# Patient Record
Sex: FEMALE | Race: WHITE | NOT HISPANIC OR LATINO | Employment: FULL TIME | URBAN - METROPOLITAN AREA
[De-identification: names, ages, dates, MRNs, and addresses within clinical notes are randomized per-mention and may not be internally consistent; named-entity substitution may affect disease eponyms.]

---

## 2017-02-21 ENCOUNTER — OFFICE VISIT (OUTPATIENT)
Dept: OBSTETRICS AND GYNECOLOGY | Facility: CLINIC | Age: 37
End: 2017-02-21
Attending: OBSTETRICS & GYNECOLOGY
Payer: COMMERCIAL

## 2017-02-21 VITALS
BODY MASS INDEX: 23.55 KG/M2 | HEIGHT: 58 IN | SYSTOLIC BLOOD PRESSURE: 110 MMHG | WEIGHT: 112.19 LBS | DIASTOLIC BLOOD PRESSURE: 72 MMHG

## 2017-02-21 DIAGNOSIS — N94.10 DYSPAREUNIA IN FEMALE: ICD-10-CM

## 2017-02-21 DIAGNOSIS — N94.810 VULVAR VESTIBULITIS: ICD-10-CM

## 2017-02-21 DIAGNOSIS — B37.31 CANDIDIASIS OF VULVA AND VAGINA: ICD-10-CM

## 2017-02-21 DIAGNOSIS — R10.2 VULVAR PAIN: Primary | ICD-10-CM

## 2017-02-21 PROCEDURE — 1160F RVW MEDS BY RX/DR IN RCRD: CPT | Mod: S$GLB,,, | Performed by: OBSTETRICS & GYNECOLOGY

## 2017-02-21 PROCEDURE — 99204 OFFICE O/P NEW MOD 45 MIN: CPT | Mod: S$GLB,,, | Performed by: OBSTETRICS & GYNECOLOGY

## 2017-02-21 PROCEDURE — 87102 FUNGUS ISOLATION CULTURE: CPT

## 2017-02-21 PROCEDURE — 99999 PR PBB SHADOW E&M-EST. PATIENT-LVL III: CPT | Mod: PBBFAC,,, | Performed by: OBSTETRICS & GYNECOLOGY

## 2017-02-21 PROCEDURE — 87210 SMEAR WET MOUNT SALINE/INK: CPT | Mod: QW,S$GLB,, | Performed by: OBSTETRICS & GYNECOLOGY

## 2017-02-21 RX ORDER — LIDOCAINE 50 MG/G
OINTMENT TOPICAL
Qty: 50 G | Refills: 2 | Status: SHIPPED | OUTPATIENT
Start: 2017-02-21 | End: 2018-10-05

## 2017-02-21 RX ORDER — MELOXICAM 7.5 MG/1
TABLET ORAL
Refills: 1 | COMMUNITY
Start: 2016-12-27 | End: 2017-11-15 | Stop reason: SDUPTHER

## 2017-02-21 NOTE — PROGRESS NOTES
Subjective:     Patient ID: Tiffanie Wharton is a 36 y.o. female.     Chief Complaint: Dyspareunia     History of Present Illness: This patient is a 36 y.o.female, who presents to the G[facility] Vul[facility] clinic for evaluation of vulvar pain and dyspareunia (discomfort with attempts to penetrate the hymenal area). the patient is able to use tampons patient is able to have a speculum and fingers inserted into the vagina with minimal discomfort.  Penetration with larger sex toys causes discomfort.     Patient's last menstrual period was 02/19/2017.    Review of Systems    GENERAL: No fever, chills, fatigability or weightchange  SKIN: No rashes, itching or changes in color or texture of skin.  HEAD: No headaches or recent head trauma.  EYES: Visual acuity fine. No photophobia,r diplopia.  EARS: Denies earache or vertigo  NOSE: No loss of smell, no epistaxis or postnasal drip.  MOUTH & THROAT: No hoarseness or change in voice.   NODES: Denies swollen glands.  CHEST: Denies THOMAS, cyanosis, wheezing, cough and sputum production.  CARDIOVASCULAR: Denies chest pain, PND, orthopnea or reduced exercise tolerance.  ABDOMEN: Appetite fine. No weight loss. bloating, Denies diarrhea, abdominal pain, hematemesis or blood in stool.  URINARY: No flank pain, dysuria or hematuria.  PERIPHERAL VASCULAR: No claudication or cyanosis.Varicosities  MUSCULOSKELETAL: No joint stiffness or swelling. Denies back pain.muscle aches  NEUROLOGIC: No history of seizures, paralysis, alteration of gait or coordination.       Objective:       Physical Exam     APPEARANCE: Well nourished, well developed, in no acute distress.    GENITOURINARY:  Vulva: No lesions. Normal female genital architecture. Q-Tip test indicates discomfort in the vestibule 1/5, around the hymenal ring from 11-6:00 ? Slightly greater on right than 1 to 6:00 Left.  Urethral Meatus: Normal size and location, no lesions, no prolapse.  Urethra: No masses, tenderness, prolapse or scarring.  Vagina:  moist with rugae, menstrual discharge noted, no significant cystocele or rectocele.  No muscle spasm noted pliable.  Cervix: No lesions, normal diameter, no stenosis, no cervical motion tenderness. .  Uterus: 6 week size, regular shape, mobile, non-tender, normal position, good support.  Adnexa: No masses, tenderness or CDS nodularity.  Anus Perineum: No lesions, no relaxation, no external hemorrhoids.  Abdomen: No masses, tenderness, hernia or ascites, no hepatasplenomegaly  Skin: No rashes, lesions, ulcers, acne, hirsutism.  Peripheral/lower extremities: No edema, erythema or tenderness.  Lymphatic: No axillary, neck or groin nodes palp.  Mental Status: Alert, oriented x 3, normal affect and mood.          @PROCEDURE:@  Wet Prep:  Wet prep omitted because of menstrual flow.       Assessment:    1. Vulvar pain    2. Dyspareunia in female    3. Vulvar vestibulitis               Plan:  1.  Candida culture taken today.  2.  Discussed vulvodynia pathophysiology and the reason for the treatment with neurotransmitter blockers and local anesthetics.  3.  Discussed increasing her Lexapro to 20 mg per day.  4.  Discussed lidocaine ointment use and pelvic floor physical therapy referral for evaluation and possible dilator therapy.  5.   Return to clinic in 9 weeks.              No orders of the defined types were placed in this encounter.

## 2017-02-21 NOTE — MR AVS SNAPSHOT
[facility] [address]  [address]  [place] [address]  Phone: [phone]  Fax: [phone]                  Tiffanie Wharton   2017 11:00 AM   Office Visit    Description:  Female : 1980   Provider:  Albino Singh III, MD   Department:  [facility] [address]           Reason for Visit     Dyspareunia                To Do List           Goals (5 Years of Data)     None      [facility]     [facility] On Call Nurse [facility] -  Assistance  Registered nurses in the [facility] On Call Center provide clinical advisement, health education, appointment booking, and other advisory services.  Call for this free service at [phone].             Medications           Message regarding Medications     Verify the changes and/or additions to your medication regime listed below are the same as discussed with your clinician today.  If any of these changes or additions are incorrect, please notify your healthcare provider.        STOP taking these medications     azithromycin (ZITHROMAX Z-BISI) 250 MG tablet As directed           Verify that the below list of medications is an accurate representation of the medications you are currently taking.  If none reported, the list may be blank. If incorrect, please contact your healthcare provider. Carry this list with you in case of emergency.           Current Medications     clobetasol (TEMOVATE) 0.05 % cream 1 application 2 (two) times daily. Apply to affected area    clonazePAM (KLONOPIN) 0.5 MG tablet Take 0.5 mg by mouth as needed.     dextroamphetamine (DEXEDRINE SPANSULE) 10 MG 24 hr capsule Take 10 mg by mouth once daily.    DOMPERIDONE, BULK, MISC by Misc.(Non-Drug; Combo Route) route.    trazodone (DESYREL) 25 MG tablet Take by mouth every evening.    EPIPEN 2-BISI 0.3 mg/0.3 mL AtIn INJECT 0.3 MLS (0.3 MG TOTAL) INTO THE MUSCLE ONCE.    EPIPEN 2-BISI 0.3 mg/0.3 mL AtIn INJECT 0.3 MLS (0.3 MG TOTAL) INTO THE MUSCLE ONCE.     "escitalopram oxalate (LEXAPRO) 10 MG tablet Take 1 tablet by mouth once daily.    hydrocodone-acetaminophen 5-325mg (NORCO) 5-325 mg per tablet Take 1 tablet by mouth every 4 to 6 hours as needed for Pain.    hydrocodone-acetaminophen 5-325mg (NORCO) 5-325 mg per tablet Take 1 tablet by mouth every 6 (six) hours as needed for Pain.    lamotrigine (LAMICTAL) 100 MG tablet Take 100 mg by mouth once daily.    meloxicam (MOBIC) 7.5 MG tablet TAKE 1 TO 2 TABLETS BY MOUTH EVERY DAY    PREVIDENT 5000 BOOSTER PLUS 1.1 % Pste AS DIRECTED    promethazine (PHENERGAN) 25 MG tablet Take 25 mg by mouth every 4 (four) hours.           Clinical Reference Information           Your Vitals Were     BP Height Weight Last Period BMI    110/72 4' 10" (1.473 m) 50.9 kg (112 lb 3.4 oz) 02/19/2017 23.45 kg/m2      Blood Pressure          Most Recent Value    BP  110/72      Allergies as of 2/21/2017     Penicillins      Immunizations Administered on Date of Encounter - 2/21/2017     None      Language Assistance Services     ATTENTION: Language assistance services are available, free of charge. Please call [phone].      ATENCIÓN: Si nolanla yuri, tiene a dorman disposición servicios gratuitos de asistencia lingüística. Llame al [phone].     [facility] Ý: N?u b?n nói Ti?ng Vi?t, có các d?ch v? h? tr? ngôn ng? mi?n phí dành cho b?n. G?i s? [phone].         [facility] [address] complies with applicable Federal civil rights laws and does not discriminate on the basis of race, color, national origin, age, disability, or sex.        "

## 2017-03-02 DIAGNOSIS — B37.31 CANDIDA VAGINITIS: Primary | ICD-10-CM

## 2017-03-02 RX ORDER — FLUCONAZOLE 200 MG/1
TABLET ORAL
Qty: 3 TABLET | Refills: 0 | Status: SHIPPED | OUTPATIENT
Start: 2017-03-02 | End: 2018-10-05

## 2017-03-02 NOTE — TELEPHONE ENCOUNTER
----- Message from Albino Singh III, MD sent at 3/2/2017  1:28 PM CST -----  Notify the patient that her Candida culture was positive.  Treated with Diflucan 200 mg, dispense 3 tablet, she is to take one tablet every 3rd day for 3 doses.

## 2017-03-16 ENCOUNTER — CLINICAL SUPPORT (OUTPATIENT)
Dept: REHABILITATION | Facility: HOSPITAL | Age: 37
End: 2017-03-16
Attending: OBSTETRICS & GYNECOLOGY
Payer: COMMERCIAL

## 2017-03-16 DIAGNOSIS — R10.2 PELVIC PAIN IN FEMALE: ICD-10-CM

## 2017-03-16 PROCEDURE — 97110 THERAPEUTIC EXERCISES: CPT | Mod: PO

## 2017-03-16 PROCEDURE — 97161 PT EVAL LOW COMPLEX 20 MIN: CPT | Mod: PO

## 2017-03-16 NOTE — PATIENT INSTRUCTIONS
Home Exercise Program: 03/16/2017      DIAPHRAGMATIC BREATHING     The diaphragm is a dome shaped muscle that forms the floor of the rib cage. It is the most efficient muscle for breathing and relaxation, although most people are not used to using the diaphragm. Diaphragmatic or belly breathing is an important technique to learn because it helps settle down or relax the autonomic nervous system. The correct use of diaphragmatic breathing can help to quiet brain activity resulting in the relaxation of all the muscles and organs of the body. This is accomplished by slow rhythmic breathing concentrated in the diaphragm muscle rather than the chest.    How to do proper relaxation breathing:     Start by lying on your back or reclining in a chair in a relaxed position. Place one hand on your chest and the other on your abdomen.   Relax your jaw by placing your tongue on the roof of your mouth and keeping your teeth slightly apart.    Take a deep breath in through your nose, letting the abdomen expand and rise while you keep your upper chest, neck and shoulders relaxed.    As you breathe out through your mouth, allow your abdomen and chest to fall. Exhale completely.   Remember to breathe slowly.  Do not force your breathing. Do not hold your breath.   Repeat for 5 minutes every day.      Home Perineal Desensitization:    5-7 days per week, spend about 5-10 minutes mobilizing the tissues at the bottom of the vaginal opening (6 o'clock position).  You should only need to insert your thumb to the first knuckle.  Wash hand prior, and be careful not to cross-contaminate yourself- avoid the perineal body and anal area.  Start with pressure only in a few directions, then progress to motion side to side and back and forth.  No more than 5/10 pain with this.  Do not do during your period or if you have an infection.      [email or web address] for dilator set

## 2017-03-16 NOTE — PROGRESS NOTES
Patient: Tiffanie Wharton   [facility] #:  [id number]    Date of treatment: 03/16/2017   Time in: 11:13  Time out: 12:05    Tiffanie is a 36 y.o. female evaluated on 3/16/2017    Physician:  Albino Singh III, *   Diagnosis:   Encounter Diagnosis   Name Primary?    Pelvic pain in female         Treatment ordered: PT    Medical History:   Past Medical History:   Diagnosis Date    ADHD (attention deficit hyperactivity disorder)     Crohn's disease     Gastroparesis         Surgical History:   Past Surgical History:   Procedure Laterality Date    ABDOMINAL SURGERY          Medications:   Current Outpatient Prescriptions   Medication Sig    clobetasol (TEMOVATE) 0.05 % cream 1 application 2 (two) times daily. Apply to affected area    clonazePAM (KLONOPIN) 0.5 MG tablet Take 0.5 mg by mouth as needed.     dextroamphetamine (DEXEDRINE SPANSULE) 10 MG 24 hr capsule Take 10 mg by mouth once daily.    DOMPERIDONE, BULK, MISC by Misc.(Non-Drug; Combo Route) route.    EPIPEN 2-BISI 0.3 mg/0.3 mL AtIn INJECT 0.3 MLS (0.3 MG TOTAL) INTO THE MUSCLE ONCE.    EPIPEN 2-BISI 0.3 mg/0.3 mL AtIn INJECT 0.3 MLS (0.3 MG TOTAL) INTO THE MUSCLE ONCE.    escitalopram oxalate (LEXAPRO) 10 MG tablet Take 1 tablet by mouth once daily.    fluconazole (DIFLUCAN) 200 MG Tab Take one tab by mouth today and repeat dose every third day until completed    hydrocodone-acetaminophen 5-325mg (NORCO) 5-325 mg per tablet Take 1 tablet by mouth every 4 to 6 hours as needed for Pain.    hydrocodone-acetaminophen 5-325mg (NORCO) 5-325 mg per tablet Take 1 tablet by mouth every 6 (six) hours as needed for Pain.    lamotrigine (LAMICTAL) 100 MG tablet Take 100 mg by mouth once daily.    lidocaine (XYLOCAINE) 5 % Oint ointment Apply a thin film (1/3 a pencil eraser), Twice a day, used a cotton ball at the hour sleep    meloxicam (MOBIC) 7.5 MG tablet TAKE 1 TO 2 TABLETS BY MOUTH EVERY DAY    PREVIDENT 5000 BOOSTER PLUS 1.1 % Pste AS DIRECTED     promethazine (PHENERGAN) 25 MG tablet Take 25 mg by mouth every 4 (four) hours.    trazodone (DESYREL) 25 MG tablet Take by mouth every evening.     Current Facility-Administered Medications   Medication    0.9 % NaCl with KCl 20 mEq infusion    dexamethasone injection 8 mg    ondansetron HCl (PF) 4 mg/2 mL injection 8 mg    ondansetron HCl (PF) 4 mg/2 mL injection 8 mg    ondansetron HCl (PF) 4 mg/2 mL injection 8 mg    ondansetron HCl (PF) 4 mg/2 mL injection 8 mg    ondansetron HCl (PF) 4 mg/2 mL injection 8 mg    ondansetron HCl (PF) 4 mg/2 mL injection 8 mg    ondansetron HCl (PF) 4 mg/2 mL injection 8 mg    promethazine (PHENERGAN) 12.5 mg in dextrose 5 % 50 mL IVPB    promethazine (PHENERGAN) 12.5 mg in dextrose 5 % 50 mL IVPB    promethazine (PHENERGAN) 12.5 mg in dextrose 5 % 50 mL IVPB    promethazine (PHENERGAN) 12.5 mg in dextrose 5 % 50 mL IVPB    promethazine (PHENERGAN) 12.5 mg in dextrose 5 % 50 mL IVPB    promethazine (PHENERGAN) 12.5 mg in dextrose 5 % 50 mL IVPB    promethazine (PHENERGAN) 12.5 mg in dextrose 5 % 50 mL IVPB    promethazine (PHENERGAN) 12.5 mg in dextrose 5 % 50 mL IVPB    promethazine (PHENERGAN) 12.5 mg in dextrose 5 % 50 mL IVPB    promethazine (PHENERGAN) 12.5 mg in dextrose 5 % 50 mL IVPB    promethazine (PHENERGAN) 12.5 mg in dextrose 5 % 50 mL IVPB    promethazine (PHENERGAN) 12.5 mg in dextrose 5 % 50 mL IVPB    promethazine (PHENERGAN) 12.5 mg in dextrose 5 % 50 mL IVPB    promethazine (PHENERGAN) 12.5 mg in dextrose 5 % 50 mL IVPB    promethazine (PHENERGAN) 12.5 mg in dextrose 5 % 50 mL IVPB    promethazine (PHENERGAN) 12.5 mg in sodium chloride 0.9% IVPB    promethazine (PHENERGAN) 12.5 mg in sodium chloride 0.9% IVPB    promethazine injection 12.5 mg    sodium chloride 0.9% bolus 1,000 mL    sodium chloride 0.9% bolus 1,500 mL       Allergies:   Review of patient's allergies indicates:   Allergen Reactions    Penicillins Rash       Precautions: universal  OB:GYN History:painful vaginal penetration; regular periods      Bladder/Bowel History: gastroparesis; flares produce nausea, and constipation- she cannot tolerate fiber.  Takes Mag Citrate every day. Pt has an implantable gastric stimulator.       SUBJECTIVE:   History of current complaint: pt reports that she is sexually active (with women) and has had some penetration in the past that has been comfortable, but about 5 years ago started having discomfort with insertion of sex toys.  No problems with tampon use.  She describes an uncomfortable stretching pain with penetration, at the introitus.  She denies bleeding with penetration attempts.      Date of Onset: about 5 years ago  Symptoms are: unchanged    Frequency of Urination: Daytime: less than 5 times per day (unless dehydrated due to gastroparesis)           Nighttime: 0-1    Urine Stream: strong    Frequency of Bowel Movements: 3 times per week with Mag Citrate    Types of Fluid Intake: gatorade (tolerates better than water) no caffeine or EtOH      Bladder Leakage: No    Colon Leakage: No    Activities that cause symptoms:sexual activity    Current Exercise: none; has had a recent foot fracture and has been out of a cast boot for a week     Pain:  Patient reports 7/10 with 0 being the lowest and 10 being the highest (reached her tolerance limit at this point)      OBJECTIVE:  Patient received 50 minutes of treatment which consisted of evaluation and 10 minutes of ther ex.    ORTHO SCREEN  Posture: WNL      Pelvic Alignment: deviations: L ASIS slightly higher in supine       ABDOMINALS  Scarring:  Several abdominal scars noted- no pain, good mobility      Diastasis: none   Abdominal strength: rectus 4/5     Transverse : able to isolate and contract TA with verbal cues.         VAGINAL PELVIC FLOOR EXAM  Introitus:  WNL  Skin Condition: WNL  Contraction Response:   visible lift  Valsalva Response bulge    External Clock  Scarring:  none  Sensation:WNL  Pain:Q-tip testing as follows: pain noted both superficial to and at the hymenal ring.  Pain noted 3-9 o'clock  Prolapse Check: none       Internal Clock  Pain: tender areas noted as follows: thick band of tissue at the 6 0'clock position painful with pressure and mobilization; muscles not painful  Sensation:WNL    Vaginal Vault : stenotic  Muscle Bulk:WFL  Muscle Power: 3        Quality of Contraction:  WNL  Specificity:WNL   Coordination: tends to hold breath during PFM contraction    SEMG Evaluation: deferred for now      Treatment Given: instructed on general anatomy/physiology of urinary/bowel system; discussed plan of care with patient; instructed in benefits/risks of treatment; instructed in alternative methods of treatment; instructed in risks of refusing treatment; patient agreed to treatment plan; instructed in diaphragmatic breathing per handout issued and in perineal stretching/desensitization; she was also instructed to purchase a [email or web address] dilator set.  Pt verbalized understanding of all instruction.      ASSESSMENT:   History  Co-morbidities and personal factors that may impact the plan of care Examination  Body Structures and Functions, activity limitations and participation restrictions that may impact the plan of care Clinical Presentation   Decision Making/ Complexity Score   Co-morbidities:     Gastroparesis with implantable gastric stimulator             Personal Factors:    Body Regions: painful vulvar connective tissue; tight pelvic floor muscles    Body Systems:           Activity limitations:   Pt cannot achieve full vaginal penetration during sexual activity    Participation Restrictions:          Stable and uncomplicated 8% impairment per PFDI pain survey via FOTO         Problem List:    poor knowledge of body mechanics and posture, increased tension of the pelvic muscles and poor quality of pelvic muscle contraction    Barriers to Learning:  none    Educational/Spiritual/Cultural needs:  None  Environmental Barriers: none noted  Abuse/Neglect: no signs  Nutritional Status: WDWN WF  Fall Risk: none    FUNCTIONAL GOALS: 3 months  1. Able to maintain normal breathing pattern during pelvic floor muscle contraction.,   2. [place] with less pain on initial penetration or deeply.,   3. Pt to be I with self mgmt. of symptoms.    4. Pt to be I with HEP.    PATIENTS GOALS: to be able to have vaginal penetration without pain during sexual activity    PLAN:  Myofascial release, soft tissue mobilization, stretching, theraputic exercises and neuromuscular re-ed    Physical Therapy Education: anatomy/physiology of pelvic floor, posture/body mechanices, vulvar irritants, dilator use and diaphragmatic breathing    Frequency/Duration: once per 2 weeks for 3 months     I agree with the Therapist's evaluation and therapeutic plan

## 2017-03-21 LAB — FUNGUS SPEC CULT: NORMAL

## 2017-03-28 ENCOUNTER — INFUSION (OUTPATIENT)
Dept: INFECTIOUS DISEASES | Facility: HOSPITAL | Age: 37
End: 2017-03-28
Attending: INTERNAL MEDICINE
Payer: COMMERCIAL

## 2017-03-28 VITALS
RESPIRATION RATE: 15 BRPM | BODY MASS INDEX: 23.41 KG/M2 | SYSTOLIC BLOOD PRESSURE: 105 MMHG | WEIGHT: 112 LBS | OXYGEN SATURATION: 96 % | TEMPERATURE: 98 F | DIASTOLIC BLOOD PRESSURE: 75 MMHG

## 2017-03-28 DIAGNOSIS — R11.0 NAUSEA: ICD-10-CM

## 2017-03-28 DIAGNOSIS — R52 PAIN: Primary | ICD-10-CM

## 2017-03-28 PROCEDURE — 25000003 PHARM REV CODE 250: Performed by: INTERNAL MEDICINE

## 2017-03-28 PROCEDURE — 96361 HYDRATE IV INFUSION ADD-ON: CPT

## 2017-03-28 PROCEDURE — 96365 THER/PROPH/DIAG IV INF INIT: CPT

## 2017-03-28 PROCEDURE — 63600175 PHARM REV CODE 636 W HCPCS: Performed by: INTERNAL MEDICINE

## 2017-03-28 PROCEDURE — 96374 THER/PROPH/DIAG INJ IV PUSH: CPT

## 2017-03-28 PROCEDURE — 96360 HYDRATION IV INFUSION INIT: CPT

## 2017-03-28 RX ORDER — PROMETHAZINE HYDROCHLORIDE 25 MG/ML
12.5 INJECTION, SOLUTION INTRAMUSCULAR; INTRAVENOUS ONCE
Status: CANCELLED
Start: 2017-03-28 | End: 2017-03-28

## 2017-03-28 RX ORDER — ONDANSETRON 2 MG/ML
8 INJECTION INTRAMUSCULAR; INTRAVENOUS EVERY 4 HOURS PRN
Status: CANCELLED
Start: 2017-03-28

## 2017-03-28 RX ADMIN — PROMETHAZINE HYDROCHLORIDE: 25 INJECTION INTRAMUSCULAR; INTRAVENOUS at 09:03

## 2017-03-28 RX ADMIN — SODIUM CHLORIDE 1500 ML: 0.9 INJECTION, SOLUTION INTRAVENOUS at 08:03

## 2017-03-28 NOTE — PROGRESS NOTES
Pt tolerated Normal Saline infusion@350 ml/hr and Phenergan 25 mg IVPB without difficulty, no C/O pain/discomfort, pt instructed to call with any questions/concerns related to infusion therapy, pt verbalized understanding

## 2017-04-18 ENCOUNTER — INFUSION (OUTPATIENT)
Dept: INFECTIOUS DISEASES | Facility: HOSPITAL | Age: 37
End: 2017-04-18
Attending: INTERNAL MEDICINE
Payer: COMMERCIAL

## 2017-04-18 VITALS
SYSTOLIC BLOOD PRESSURE: 98 MMHG | BODY MASS INDEX: 23.41 KG/M2 | WEIGHT: 112 LBS | DIASTOLIC BLOOD PRESSURE: 64 MMHG | TEMPERATURE: 98 F | OXYGEN SATURATION: 97 % | RESPIRATION RATE: 14 BRPM

## 2017-04-18 DIAGNOSIS — R11.0 NAUSEA: Primary | ICD-10-CM

## 2017-04-18 PROCEDURE — 96360 HYDRATION IV INFUSION INIT: CPT

## 2017-04-18 PROCEDURE — 96375 TX/PRO/DX INJ NEW DRUG ADDON: CPT

## 2017-04-18 PROCEDURE — 96374 THER/PROPH/DIAG INJ IV PUSH: CPT

## 2017-04-18 PROCEDURE — 63600175 PHARM REV CODE 636 W HCPCS: Performed by: INTERNAL MEDICINE

## 2017-04-18 PROCEDURE — 25000003 PHARM REV CODE 250: Performed by: INTERNAL MEDICINE

## 2017-04-18 PROCEDURE — 96361 HYDRATE IV INFUSION ADD-ON: CPT

## 2017-04-18 RX ORDER — ONDANSETRON 2 MG/ML
8 INJECTION INTRAMUSCULAR; INTRAVENOUS EVERY 4 HOURS PRN
Status: DISCONTINUED | OUTPATIENT
Start: 2017-04-18 | End: 2017-04-18 | Stop reason: HOSPADM

## 2017-04-18 RX ORDER — PROMETHAZINE HYDROCHLORIDE 25 MG/ML
12.5 INJECTION, SOLUTION INTRAMUSCULAR; INTRAVENOUS ONCE
Status: CANCELLED
Start: 2017-04-18 | End: 2017-04-18

## 2017-04-18 RX ORDER — ONDANSETRON 2 MG/ML
8 INJECTION INTRAMUSCULAR; INTRAVENOUS EVERY 4 HOURS PRN
Status: CANCELLED
Start: 2017-04-18

## 2017-04-18 RX ADMIN — ONDANSETRON 8 MG: 2 INJECTION INTRAMUSCULAR; INTRAVENOUS at 08:04

## 2017-04-18 RX ADMIN — SODIUM CHLORIDE 1500 ML: 0.9 INJECTION, SOLUTION INTRAVENOUS at 08:04

## 2017-04-18 NOTE — PROGRESS NOTES
Pt tolerated Normal Saline infusion@350 ml/hr, Zofran 8mg IVP without difficulty, no C/O pain/discomfort, pt instructed to call with any questions/concerns related to infusion therapy, pt verbalized understanding

## 2017-04-20 ENCOUNTER — CLINICAL SUPPORT (OUTPATIENT)
Dept: REHABILITATION | Facility: HOSPITAL | Age: 37
End: 2017-04-20
Attending: OBSTETRICS & GYNECOLOGY
Payer: COMMERCIAL

## 2017-04-20 DIAGNOSIS — R10.2 PELVIC PAIN IN FEMALE: ICD-10-CM

## 2017-04-20 PROCEDURE — 97140 MANUAL THERAPY 1/> REGIONS: CPT | Mod: PO

## 2017-04-20 NOTE — PROGRESS NOTES
Patient: Tiffanie Wharton   Clinic #: [id number]   Diagnosis:   Encounter Diagnosis   Name Primary?    Pelvic pain in female       Date of Start of care: 3/16/2017  Date of treatment: 2017   Time in: 2:00  Time out: 2:40  Total Treatment time: 40 minutes    Subjective: pt reports that she has not had a good month- had to let her cat go ( from cancer) and she forgot the dilators at home.  Has been working on the perineal massage a little.    Tiffanie reported 0/10 pain today.    Objective:      Treatment:    Tiffanie Wharton  received the following manual therapy techniques: trigger point/myofascial release and soft tissue Mobilization applied to the: levator ani B'ly and OI B'ly  x 40 minutes.   She was given instructions on how to start using her dilator set (see pt instructions).  She tolerated contract/relax techniques well, and was able to actively drop the pelvic floor with DB.      Assessment: Pt should be able to start using her dilator set now- she knows to contact me if she has any problems.  Will reassess in 2 weeks.       Will the patient continue to benefit from skilled PT intervention? Yes  Medical Necessity is demonstrated by:  Pain limits function of effected part for all activities  Progress towards goals: fair    New/Revised Goals:  none    Plan:  Continue with established Plan of Care toward PT goals.

## 2017-04-20 NOTE — PATIENT INSTRUCTIONS
"Home Program: 4/20/2017    Continue to use Diaphragmatic Breathing whenever you need to drop the pelvic floor- peeing, pooping, using dilator    Dilator stretching: 3-5 days per week for no more than 15 minutes.    1. Be sure to position yourself either reclining or seated on toilet with abs turned off.  Make sure that dilator has been cleaned and rinsed well.  2. Water based lubricant or coconut oil.  3. Use a dilator slightly larger than your finger to do muscle work.  Insert like a tampon, toward the tailbone.    4. Apply pressure to the tissues at 6 o'clock for a general stretch.    5. The levator ani muscles will be located at 4 and 8 o'clock.  Apply gentle pressure for a minute, then do a pelvic floor contraction; hold 3-5 sec, then drop.  When you drop, let the dilator "go along for the ride." Keep gentle pressure until you feel like you've dropped completely.    6. Repeat the contract/relax technique 3-5 times on each side.  You can also practice moving the skin tissues by twisting the dilator, or moving it in and out.  This may be best done with a larger size.    7. No dilator work on your period or during an active infection (yeast, UTI).  "

## 2017-04-26 ENCOUNTER — INFUSION (OUTPATIENT)
Dept: INFECTIOUS DISEASES | Facility: HOSPITAL | Age: 37
End: 2017-04-26
Attending: INTERNAL MEDICINE
Payer: COMMERCIAL

## 2017-04-26 VITALS — WEIGHT: 112 LBS | BODY MASS INDEX: 23.41 KG/M2

## 2017-04-26 DIAGNOSIS — R11.0 NAUSEA: Primary | ICD-10-CM

## 2017-04-26 PROCEDURE — 96361 HYDRATE IV INFUSION ADD-ON: CPT

## 2017-04-26 PROCEDURE — 96360 HYDRATION IV INFUSION INIT: CPT

## 2017-04-26 PROCEDURE — 96374 THER/PROPH/DIAG INJ IV PUSH: CPT

## 2017-04-26 PROCEDURE — 63600175 PHARM REV CODE 636 W HCPCS: Performed by: INTERNAL MEDICINE

## 2017-04-26 PROCEDURE — 25000003 PHARM REV CODE 250: Performed by: INTERNAL MEDICINE

## 2017-04-26 PROCEDURE — 96375 TX/PRO/DX INJ NEW DRUG ADDON: CPT

## 2017-04-26 RX ORDER — ONDANSETRON 2 MG/ML
8 INJECTION INTRAMUSCULAR; INTRAVENOUS EVERY 4 HOURS PRN
Status: DISCONTINUED | OUTPATIENT
Start: 2017-04-26 | End: 2017-04-26 | Stop reason: HOSPADM

## 2017-04-26 RX ORDER — ONDANSETRON 2 MG/ML
8 INJECTION INTRAMUSCULAR; INTRAVENOUS EVERY 4 HOURS PRN
Status: CANCELLED
Start: 2017-04-26

## 2017-04-26 RX ORDER — PROMETHAZINE HYDROCHLORIDE 25 MG/ML
12.5 INJECTION, SOLUTION INTRAMUSCULAR; INTRAVENOUS ONCE
Status: CANCELLED
Start: 2017-04-26 | End: 2017-04-26

## 2017-04-26 RX ADMIN — SODIUM CHLORIDE 1500 ML: 0.9 INJECTION, SOLUTION INTRAVENOUS at 08:04

## 2017-04-26 RX ADMIN — ONDANSETRON 8 MG: 2 INJECTION INTRAMUSCULAR; INTRAVENOUS at 08:04

## 2017-05-02 ENCOUNTER — PATIENT MESSAGE (OUTPATIENT)
Dept: REHABILITATION | Facility: HOSPITAL | Age: 37
End: 2017-05-02

## 2017-05-02 ENCOUNTER — INFUSION (OUTPATIENT)
Dept: INFECTIOUS DISEASES | Facility: HOSPITAL | Age: 37
End: 2017-05-02
Attending: INTERNAL MEDICINE
Payer: COMMERCIAL

## 2017-05-02 VITALS
TEMPERATURE: 98 F | DIASTOLIC BLOOD PRESSURE: 68 MMHG | BODY MASS INDEX: 23.41 KG/M2 | HEART RATE: 91 BPM | RESPIRATION RATE: 16 BRPM | SYSTOLIC BLOOD PRESSURE: 100 MMHG | WEIGHT: 112 LBS

## 2017-05-02 DIAGNOSIS — R11.0 NAUSEA: Primary | ICD-10-CM

## 2017-05-02 PROCEDURE — 96375 TX/PRO/DX INJ NEW DRUG ADDON: CPT

## 2017-05-02 PROCEDURE — 63600175 PHARM REV CODE 636 W HCPCS: Performed by: INTERNAL MEDICINE

## 2017-05-02 PROCEDURE — 96361 HYDRATE IV INFUSION ADD-ON: CPT

## 2017-05-02 PROCEDURE — 96365 THER/PROPH/DIAG IV INF INIT: CPT

## 2017-05-02 PROCEDURE — 25000003 PHARM REV CODE 250: Performed by: INTERNAL MEDICINE

## 2017-05-02 RX ORDER — PROMETHAZINE HYDROCHLORIDE 25 MG/ML
12.5 INJECTION, SOLUTION INTRAMUSCULAR; INTRAVENOUS ONCE
Status: CANCELLED
Start: 2017-05-02 | End: 2017-05-02

## 2017-05-02 RX ORDER — ONDANSETRON 2 MG/ML
8 INJECTION INTRAMUSCULAR; INTRAVENOUS EVERY 4 HOURS PRN
Status: CANCELLED
Start: 2017-05-02

## 2017-05-02 RX ORDER — ONDANSETRON 2 MG/ML
8 INJECTION INTRAMUSCULAR; INTRAVENOUS EVERY 4 HOURS PRN
Status: DISCONTINUED | OUTPATIENT
Start: 2017-05-02 | End: 2017-05-02 | Stop reason: HOSPADM

## 2017-05-02 RX ADMIN — ONDANSETRON 8 MG: 2 INJECTION INTRAMUSCULAR; INTRAVENOUS at 08:05

## 2017-05-02 RX ADMIN — SODIUM CHLORIDE 1500 ML: 0.9 INJECTION, SOLUTION INTRAVENOUS at 08:05

## 2017-05-10 ENCOUNTER — INFUSION (OUTPATIENT)
Dept: INFECTIOUS DISEASES | Facility: HOSPITAL | Age: 37
End: 2017-05-10
Attending: INTERNAL MEDICINE
Payer: COMMERCIAL

## 2017-05-10 VITALS
BODY MASS INDEX: 23.51 KG/M2 | RESPIRATION RATE: 13 BRPM | TEMPERATURE: 97 F | OXYGEN SATURATION: 95 % | HEIGHT: 58 IN | DIASTOLIC BLOOD PRESSURE: 68 MMHG | WEIGHT: 112 LBS | SYSTOLIC BLOOD PRESSURE: 91 MMHG | HEART RATE: 86 BPM

## 2017-05-10 DIAGNOSIS — R11.0 NAUSEA: Primary | ICD-10-CM

## 2017-05-10 PROCEDURE — 96374 THER/PROPH/DIAG INJ IV PUSH: CPT

## 2017-05-10 PROCEDURE — 96360 HYDRATION IV INFUSION INIT: CPT

## 2017-05-10 PROCEDURE — 63600175 PHARM REV CODE 636 W HCPCS: Performed by: INTERNAL MEDICINE

## 2017-05-10 PROCEDURE — 25000003 PHARM REV CODE 250: Performed by: INTERNAL MEDICINE

## 2017-05-10 PROCEDURE — 96361 HYDRATE IV INFUSION ADD-ON: CPT

## 2017-05-10 PROCEDURE — 96375 TX/PRO/DX INJ NEW DRUG ADDON: CPT

## 2017-05-10 RX ORDER — ONDANSETRON 2 MG/ML
8 INJECTION INTRAMUSCULAR; INTRAVENOUS EVERY 4 HOURS PRN
Status: DISCONTINUED | OUTPATIENT
Start: 2017-05-10 | End: 2017-05-10 | Stop reason: HOSPADM

## 2017-05-10 RX ORDER — PROMETHAZINE HYDROCHLORIDE 25 MG/ML
12.5 INJECTION, SOLUTION INTRAMUSCULAR; INTRAVENOUS ONCE
Status: CANCELLED
Start: 2017-05-10 | End: 2017-05-10

## 2017-05-10 RX ORDER — ONDANSETRON 2 MG/ML
8 INJECTION INTRAMUSCULAR; INTRAVENOUS EVERY 4 HOURS PRN
Status: CANCELLED
Start: 2017-05-10

## 2017-05-10 RX ADMIN — ONDANSETRON 8 MG: 2 INJECTION INTRAMUSCULAR; INTRAVENOUS at 09:05

## 2017-05-10 RX ADMIN — SODIUM CHLORIDE 1500 ML: 0.9 INJECTION, SOLUTION INTRAVENOUS at 09:05

## 2017-05-25 ENCOUNTER — LAB VISIT (OUTPATIENT)
Dept: LAB | Facility: OTHER | Age: 37
End: 2017-05-25
Attending: INTERNAL MEDICINE
Payer: COMMERCIAL

## 2017-05-25 DIAGNOSIS — R10.2 ADNEXAL TENDERNESS: ICD-10-CM

## 2017-05-25 DIAGNOSIS — F32.89 DEPRESSIVE DISORDER, NOT ELSEWHERE CLASSIFIED: ICD-10-CM

## 2017-05-25 DIAGNOSIS — R52 PAIN: ICD-10-CM

## 2017-05-25 DIAGNOSIS — K31.84 GASTROPARESIS: ICD-10-CM

## 2017-05-25 DIAGNOSIS — R11.0 NAUSEA: ICD-10-CM

## 2017-05-25 LAB
ALBUMIN SERPL BCP-MCNC: 4.4 G/DL
ALP SERPL-CCNC: 64 U/L
ALT SERPL W/O P-5'-P-CCNC: 11 U/L
ANION GAP SERPL CALC-SCNC: 14 MMOL/L
AST SERPL-CCNC: 18 U/L
BASOPHILS # BLD AUTO: 0.02 K/UL
BASOPHILS NFR BLD: 0.3 %
BILIRUB SERPL-MCNC: 0.3 MG/DL
BUN SERPL-MCNC: 10 MG/DL
CALCIUM SERPL-MCNC: 9.7 MG/DL
CHLORIDE SERPL-SCNC: 102 MMOL/L
CO2 SERPL-SCNC: 23 MMOL/L
CREAT SERPL-MCNC: 0.8 MG/DL
DIFFERENTIAL METHOD: NORMAL
EOSINOPHIL # BLD AUTO: 0.2 K/UL
EOSINOPHIL NFR BLD: 3.5 %
ERYTHROCYTE [DISTWIDTH] IN BLOOD BY AUTOMATED COUNT: 13 %
EST. GFR  (AFRICAN AMERICAN): >60 ML/MIN/1.73 M^2
EST. GFR  (NON AFRICAN AMERICAN): >60 ML/MIN/1.73 M^2
GLUCOSE SERPL-MCNC: 88 MG/DL
HCT VFR BLD AUTO: 40 %
HGB BLD-MCNC: 13.3 G/DL
LYMPHOCYTES # BLD AUTO: 1.9 K/UL
LYMPHOCYTES NFR BLD: 31.4 %
MAGNESIUM SERPL-MCNC: 2.2 MG/DL
MCH RBC QN AUTO: 28.9 PG
MCHC RBC AUTO-ENTMCNC: 33.3 %
MCV RBC AUTO: 87 FL
MONOCYTES # BLD AUTO: 0.6 K/UL
MONOCYTES NFR BLD: 10 %
NEUTROPHILS # BLD AUTO: 3.3 K/UL
NEUTROPHILS NFR BLD: 54.5 %
PLATELET # BLD AUTO: 261 K/UL
PMV BLD AUTO: 12.5 FL
POTASSIUM SERPL-SCNC: 3.8 MMOL/L
PROT SERPL-MCNC: 7.8 G/DL
RBC # BLD AUTO: 4.61 M/UL
SODIUM SERPL-SCNC: 139 MMOL/L
T4 FREE SERPL-MCNC: 0.84 NG/DL
TSH SERPL DL<=0.005 MIU/L-ACNC: 1.37 UIU/ML
VIT B12 SERPL-MCNC: 969 PG/ML
WBC # BLD AUTO: 5.98 K/UL

## 2017-05-25 PROCEDURE — 85025 COMPLETE CBC W/AUTO DIFF WBC: CPT

## 2017-05-25 PROCEDURE — 80053 COMPREHEN METABOLIC PANEL: CPT

## 2017-05-25 PROCEDURE — 36415 COLL VENOUS BLD VENIPUNCTURE: CPT

## 2017-05-25 PROCEDURE — 82607 VITAMIN B-12: CPT

## 2017-05-25 PROCEDURE — 81241 F5 GENE: CPT

## 2017-05-25 PROCEDURE — 84439 ASSAY OF FREE THYROXINE: CPT

## 2017-05-25 PROCEDURE — 83735 ASSAY OF MAGNESIUM: CPT

## 2017-05-25 PROCEDURE — 84443 ASSAY THYROID STIM HORMONE: CPT

## 2017-05-31 ENCOUNTER — PATIENT MESSAGE (OUTPATIENT)
Dept: REHABILITATION | Facility: HOSPITAL | Age: 37
End: 2017-05-31

## 2017-06-28 ENCOUNTER — PATIENT MESSAGE (OUTPATIENT)
Dept: REHABILITATION | Facility: HOSPITAL | Age: 37
End: 2017-06-28

## 2017-07-06 ENCOUNTER — CLINICAL SUPPORT (OUTPATIENT)
Dept: REHABILITATION | Facility: HOSPITAL | Age: 37
End: 2017-07-06
Attending: OBSTETRICS & GYNECOLOGY
Payer: COMMERCIAL

## 2017-07-06 DIAGNOSIS — R10.2 PELVIC PAIN IN FEMALE: ICD-10-CM

## 2017-07-06 PROCEDURE — 97110 THERAPEUTIC EXERCISES: CPT | Mod: PO

## 2017-07-06 NOTE — PROGRESS NOTES
Patient: Tiffanie Wharton   Clinic #: [id number]   Diagnosis:   Encounter Diagnosis   Name Primary?    Pelvic pain in female       Date of Start of care: 3/16/2017  Date of treatment: 07/06/2017   Time in: 9:30  Time out: 9:55  Total Treatment time: 25 minutes    Subjective: pt reports that she has left her dilators in [place].  Has not been able to use them for about a week and a half.  She has only used the smallest size because she didn't know how to progress herself.  She notes that she holds her PF muscles tight a lot, and that she uses DB to help to drop and release the pelvic floor muscles when she notices this.       Tiffanie reported 0/10 pain today.    Objective:      Treatment: pt performed the following therapeutic exercise x 25 minutes for increased coordination: Kegels with manual cueing; DB for pelvic floor drop.  She was noted to be able to perform both appropriately with verbal cues to hold pelvic floor lift without breath holding (was able to correct).  No pain was noted in either LA or OI with palpation.  We reviewed her dilator plan- she was instructed to progress to the next size, and work with 2 sizes at a time.  When the larger size becomes tolerable with both insertion and movement, she is to move on to the next size up.  I advised her to work on this 5 days per week, until she is able to tolerate penetration as is her goal, then to work once per week, depending on how often she is sexually active (her partner lives out of town right now.).  I gave her 4-5/10 as the limit on pain with dilator work, and reminded her to stop dilator work with any bleeding or infection.  She has dept phone number and can contact me by email with questions.      Assessment:  Pt's muscles look good and she is demonstrating the ability to lift and drop.  I with use of dilators after instruction.  No further need for PT services at this time.       Goals:  1. Able to maintain normal breathing pattern during pelvic floor  muscle contraction.,   2. [place] with less pain on initial penetration or deeply.,   3. Pt to be I with self mgmt. of symptoms.    4. Pt to be I with HEP.  ALL MET    Plan:  D/c PT

## 2017-07-13 LAB — F5 P.R506Q BLD/T QL: NORMAL

## 2017-09-10 ENCOUNTER — HOSPITAL ENCOUNTER (EMERGENCY)
Facility: OTHER | Age: 37
Discharge: HOME | End: 2017-09-10
Attending: EMERGENCY MEDICINE
Payer: COMMERCIAL

## 2017-09-10 VITALS
DIASTOLIC BLOOD PRESSURE: 73 MMHG | TEMPERATURE: 99 F | RESPIRATION RATE: 15 BRPM | WEIGHT: 120 LBS | SYSTOLIC BLOOD PRESSURE: 113 MMHG | HEART RATE: 84 BPM | HEIGHT: 58 IN | BODY MASS INDEX: 25.19 KG/M2 | OXYGEN SATURATION: 97 %

## 2017-09-10 DIAGNOSIS — S09.90XA HEAD TRAUMA, INITIAL ENCOUNTER: Primary | ICD-10-CM

## 2017-09-10 DIAGNOSIS — S06.0X0A CONCUSSION, WITHOUT LOC, INITIAL ENCOUNTER: ICD-10-CM

## 2017-09-10 LAB
ALBUMIN SERPL BCP-MCNC: 3.7 G/DL
ALP SERPL-CCNC: 70 U/L
ALT SERPL W/O P-5'-P-CCNC: 15 U/L
ANION GAP SERPL CALC-SCNC: 10 MMOL/L
AST SERPL-CCNC: 16 U/L
B-HCG UR QL: NEGATIVE
BASOPHILS # BLD AUTO: 0.02 K/UL
BASOPHILS NFR BLD: 0.3 %
BILIRUB SERPL-MCNC: 0.4 MG/DL
BILIRUB UR QL STRIP: NEGATIVE
BUN SERPL-MCNC: 10 MG/DL
CALCIUM SERPL-MCNC: 9.6 MG/DL
CHLORIDE SERPL-SCNC: 103 MMOL/L
CLARITY UR: ABNORMAL
CO2 SERPL-SCNC: 27 MMOL/L
COLOR UR: YELLOW
CREAT SERPL-MCNC: 0.8 MG/DL
CTP QC/QA: YES
DIFFERENTIAL METHOD: NORMAL
EOSINOPHIL # BLD AUTO: 0.1 K/UL
EOSINOPHIL NFR BLD: 1 %
ERYTHROCYTE [DISTWIDTH] IN BLOOD BY AUTOMATED COUNT: 12.7 %
EST. GFR  (AFRICAN AMERICAN): >60 ML/MIN/1.73 M^2
EST. GFR  (NON AFRICAN AMERICAN): >60 ML/MIN/1.73 M^2
GLUCOSE SERPL-MCNC: 96 MG/DL
GLUCOSE UR QL STRIP: NEGATIVE
HCT VFR BLD AUTO: 39.2 %
HGB BLD-MCNC: 13.1 G/DL
HGB UR QL STRIP: NEGATIVE
KETONES UR QL STRIP: NEGATIVE
LEUKOCYTE ESTERASE UR QL STRIP: NEGATIVE
LYMPHOCYTES # BLD AUTO: 1.7 K/UL
LYMPHOCYTES NFR BLD: 29.3 %
MCH RBC QN AUTO: 28.9 PG
MCHC RBC AUTO-ENTMCNC: 33.4 G/DL
MCV RBC AUTO: 87 FL
MONOCYTES # BLD AUTO: 0.4 K/UL
MONOCYTES NFR BLD: 7 %
NEUTROPHILS # BLD AUTO: 3.6 K/UL
NEUTROPHILS NFR BLD: 62.2 %
NITRITE UR QL STRIP: NEGATIVE
PH UR STRIP: 8 [PH] (ref 5–8)
PLATELET # BLD AUTO: 278 K/UL
PMV BLD AUTO: 11.4 FL
POTASSIUM SERPL-SCNC: 4.1 MMOL/L
PROT SERPL-MCNC: 7.4 G/DL
PROT UR QL STRIP: NEGATIVE
RBC # BLD AUTO: 4.53 M/UL
SODIUM SERPL-SCNC: 140 MMOL/L
SP GR UR STRIP: 1.02 (ref 1–1.03)
URN SPEC COLLECT METH UR: ABNORMAL
UROBILINOGEN UR STRIP-ACNC: NEGATIVE EU/DL
WBC # BLD AUTO: 5.83 K/UL

## 2017-09-10 PROCEDURE — 81025 URINE PREGNANCY TEST: CPT | Performed by: EMERGENCY MEDICINE

## 2017-09-10 PROCEDURE — 80053 COMPREHEN METABOLIC PANEL: CPT

## 2017-09-10 PROCEDURE — 96375 TX/PRO/DX INJ NEW DRUG ADDON: CPT

## 2017-09-10 PROCEDURE — 96365 THER/PROPH/DIAG IV INF INIT: CPT

## 2017-09-10 PROCEDURE — 85025 COMPLETE CBC W/AUTO DIFF WBC: CPT

## 2017-09-10 PROCEDURE — 63600175 PHARM REV CODE 636 W HCPCS: Performed by: EMERGENCY MEDICINE

## 2017-09-10 PROCEDURE — 99284 EMERGENCY DEPT VISIT MOD MDM: CPT | Mod: 25

## 2017-09-10 PROCEDURE — 81003 URINALYSIS AUTO W/O SCOPE: CPT

## 2017-09-10 PROCEDURE — 96361 HYDRATE IV INFUSION ADD-ON: CPT

## 2017-09-10 PROCEDURE — 25000003 PHARM REV CODE 250: Performed by: EMERGENCY MEDICINE

## 2017-09-10 RX ORDER — ONDANSETRON 2 MG/ML
8 INJECTION INTRAMUSCULAR; INTRAVENOUS
Status: COMPLETED | OUTPATIENT
Start: 2017-09-10 | End: 2017-09-10

## 2017-09-10 RX ORDER — ESOMEPRAZOLE MAGNESIUM 40 MG/1
40 CAPSULE, DELAYED RELEASE ORAL
COMMUNITY

## 2017-09-10 RX ADMIN — ONDANSETRON 8 MG: 2 INJECTION INTRAMUSCULAR; INTRAVENOUS at 04:09

## 2017-09-10 RX ADMIN — PROMETHAZINE HYDROCHLORIDE 12.5 MG: 25 INJECTION INTRAMUSCULAR; INTRAVENOUS at 06:09

## 2017-09-10 RX ADMIN — SODIUM CHLORIDE 1000 ML: 0.9 INJECTION, SOLUTION INTRAVENOUS at 04:09

## 2017-09-10 NOTE — ED PROVIDER NOTES
"Lainey Date: 9/10/2017    SCRIBE #1 NOTE: I, Leslie Bautista, am scribing for, and in the presence of, Dr. Soares.       History     Chief Complaint   Patient presents with    Dizziness     C/o dizziness, nausea, decreased hearing and change in vision in right eye s/p hitting right side of occipital scalp last PM. Denies loc, vomiting. States "I'm feeling a little slow cognitively."      Time seen by provider: 4:14 PM    This is a 37 y.o. Female, with Hx of gastroparesis, who presents with complaint of dizziness after head trauma yesterday. Pt reports she hit the back of her head when standing up and hitting head on bookshelf, and immediatly went into a short "daze" but no LOC. She reports associated nausea, decreased hearing on R side, change in R peripheral vision, and feeling 'slow'. Decreased PO intake today as well with some nausea, but unlike her nausea with gastroparesis, which has been well managed recently. She denies vomiting, SOB, neck pain, back pain, or myalgias. No other injuries or complaints      The history is provided by the patient.     Review of patient's allergies indicates:   Allergen Reactions    Penicillins Rash     Past Medical History:   Diagnosis Date    ADHD (attention deficit hyperactivity disorder)     Crohn's disease     Gastroparesis      Past Surgical History:   Procedure Laterality Date    ABDOMINAL SURGERY       Family History   Problem Relation Age of Onset    Ovarian cancer Other     Ovarian cancer Other     Breast cancer Neg Hx      Social History   Substance Use Topics    Smoking status: Former Smoker     Quit date: 1/1/2009    Smokeless tobacco: Not on file    Alcohol use No     Review of Systems   Constitutional: Negative for fever.   HENT: Negative for sore throat.    Eyes: Positive for visual disturbance.   Respiratory: Negative for shortness of breath.    Cardiovascular: Negative for chest pain.   Gastrointestinal: Positive for nausea. Negative for " vomiting.   Genitourinary: Negative for dysuria.   Musculoskeletal: Negative for back pain, myalgias and neck pain.   Skin: Negative for rash.   Neurological: Positive for dizziness. Negative for seizures.   Hematological: Does not bruise/bleed easily.       Physical Exam     Initial Vitals [09/10/17 1512]   BP Pulse Resp Temp SpO2   139/89 (!) 113 20 98.8 °F (37.1 °C) 99 %      MAP       105.67         Physical Exam    Constitutional: She appears well-developed and well-nourished. She is not diaphoretic. No distress.   HENT:   Head: Normocephalic and atraumatic.   Mouth/Throat: Oropharynx is clear and moist.   No scalp hematoma.    Eyes: EOM are normal. Pupils are equal, round, and reactive to light. Right eye exhibits no discharge. Left eye exhibits no discharge.   Neck: Normal range of motion. Neck supple.   No C-spine tenderness.    Cardiovascular: Normal rate, regular rhythm, normal heart sounds and intact distal pulses.   Pulmonary/Chest: Breath sounds normal. No respiratory distress. She has no wheezes. She has no rales.   Abdominal: Soft. There is no tenderness. There is no rebound and no guarding.   Musculoskeletal: Normal range of motion. She exhibits no edema or tenderness.   Lymphadenopathy:     She has no cervical adenopathy.   Neurological: She is alert and oriented to person, place, and time.   Equal strength with some tremors with right leg and arm movement.    Skin: Skin is warm and dry.   Psychiatric: She has a normal mood and affect. Her behavior is normal. Judgment and thought content normal.         ED Course   Procedures  Labs Reviewed   URINALYSIS - Abnormal; Notable for the following:        Result Value    Appearance, UA Hazy (*)     All other components within normal limits   CBC W/ AUTO DIFFERENTIAL   COMPREHENSIVE METABOLIC PANEL   POCT URINE PREGNANCY             Medical Decision Making:   Initial Assessment:       37F with h/o gastroparesis, presents with 'dazed' feeling, nausea unlike  her typical gastroparesis, some subjective R sided decreased hearing and peripheral vision, since hitting head yesterday with no LOC. Also c/o R side not moving normally, but with normal strength and neuro exam, including peripheral vision.    Most likely concussion. Pt on no anti-coagulants or NSAIDs, but given sx CT head checked and no sign intra-cranial injury. No significant HA to suggest subtle SAH. Labs WNL, pt much improved after IVF and anti-emetics.   Discharged with concussion precautions, will f/u Neuro and return for any worsening.    Clinical Tests:   Lab Tests: Ordered and Reviewed            Scribe Attestation:   Scribe #1: I performed the above scribed service and the documentation accurately describes the services I performed. I attest to the accuracy of the note.    Attending Attestation:           Physician Attestation for Scribe:  Physician Attestation Statement for Scribe #1: I, Dr. Soares, reviewed documentation, as scribed by Leslie Bautista  in my presence, and it is both accurate and complete.                 ED Course      1. Head trauma, initial encounter    2. Concussion, without LOC, initial encounter                               Nico Soares MD  09/12/17 8894

## 2017-09-10 NOTE — ED NOTES
Pt requesting IV phenergan to be ran over an hour due to it causing her restless leg syndrome. Medication placed on pump and ran per pt request

## 2017-09-10 NOTE — ED TRIAGE NOTES
Pt reports hitting head last night on bookshelf and having temporary daze. Pt reports this morning she was nauseated that wasn't her typical nausea from gastroparesis. Pt reports a change in hearing and vision on effected side. Pt reports R side weakness and feeling funny.

## 2017-09-21 ENCOUNTER — INFUSION (OUTPATIENT)
Dept: INFECTIOUS DISEASES | Facility: HOSPITAL | Age: 37
End: 2017-09-21
Attending: INTERNAL MEDICINE
Payer: COMMERCIAL

## 2017-09-21 VITALS
OXYGEN SATURATION: 97 % | TEMPERATURE: 99 F | RESPIRATION RATE: 16 BRPM | SYSTOLIC BLOOD PRESSURE: 104 MMHG | DIASTOLIC BLOOD PRESSURE: 62 MMHG | BODY MASS INDEX: 25.73 KG/M2 | HEART RATE: 111 BPM | HEIGHT: 58 IN | WEIGHT: 122.56 LBS

## 2017-09-21 DIAGNOSIS — R11.0 NAUSEA: ICD-10-CM

## 2017-09-21 DIAGNOSIS — R52 PAIN: Primary | ICD-10-CM

## 2017-09-21 DIAGNOSIS — K31.84 GASTROPARESIS: ICD-10-CM

## 2017-09-21 PROCEDURE — 96361 HYDRATE IV INFUSION ADD-ON: CPT

## 2017-09-21 PROCEDURE — 96360 HYDRATION IV INFUSION INIT: CPT

## 2017-09-21 PROCEDURE — 25000003 PHARM REV CODE 250: Performed by: INTERNAL MEDICINE

## 2017-09-21 PROCEDURE — 63600175 PHARM REV CODE 636 W HCPCS: Performed by: INTERNAL MEDICINE

## 2017-09-21 RX ORDER — SODIUM CHLORIDE 0.9 % (FLUSH) 0.9 %
10 SYRINGE (ML) INJECTION
Status: CANCELLED | OUTPATIENT
Start: 2017-09-21

## 2017-09-21 RX ORDER — ONDANSETRON 2 MG/ML
8 INJECTION INTRAMUSCULAR; INTRAVENOUS EVERY 4 HOURS PRN
Status: DISCONTINUED | OUTPATIENT
Start: 2017-09-21 | End: 2017-09-21 | Stop reason: HOSPADM

## 2017-09-21 RX ORDER — ONDANSETRON 2 MG/ML
8 INJECTION INTRAMUSCULAR; INTRAVENOUS EVERY 4 HOURS PRN
Status: CANCELLED
Start: 2017-09-21

## 2017-09-21 RX ORDER — HEPARIN SODIUM (PORCINE) LOCK FLUSH IV SOLN 100 UNIT/ML 100 UNIT/ML
100 SOLUTION INTRAVENOUS
Status: CANCELLED | OUTPATIENT
Start: 2017-09-21

## 2017-09-21 RX ADMIN — SODIUM CHLORIDE 1000 ML: 0.9 INJECTION, SOLUTION INTRAVENOUS at 11:09

## 2017-09-21 RX ADMIN — ONDANSETRON 8 MG: 2 INJECTION INTRAMUSCULAR; INTRAVENOUS at 11:09

## 2017-09-21 RX ADMIN — SODIUM CHLORIDE 500 ML: 0.9 INJECTION, SOLUTION INTRAVENOUS at 01:09

## 2017-09-21 NOTE — PROGRESS NOTES
Pt tolerated Normal Saline infusion, Zofran 8mg IVP without difficulty, no C/O pain/discomfort, pt instructed to call with any questions/concerns related to infusion therapy, pt verbalized understanding

## 2017-09-26 ENCOUNTER — INFUSION (OUTPATIENT)
Dept: INFECTIOUS DISEASES | Facility: HOSPITAL | Age: 37
End: 2017-09-26
Attending: INTERNAL MEDICINE
Payer: COMMERCIAL

## 2017-09-26 VITALS
DIASTOLIC BLOOD PRESSURE: 76 MMHG | HEART RATE: 96 BPM | RESPIRATION RATE: 23 BRPM | SYSTOLIC BLOOD PRESSURE: 116 MMHG | OXYGEN SATURATION: 97 % | TEMPERATURE: 99 F

## 2017-09-26 DIAGNOSIS — R11.0 NAUSEA: ICD-10-CM

## 2017-09-26 DIAGNOSIS — R52 PAIN: Primary | ICD-10-CM

## 2017-09-26 DIAGNOSIS — K31.84 GASTROPARESIS: ICD-10-CM

## 2017-09-26 PROCEDURE — 96361 HYDRATE IV INFUSION ADD-ON: CPT

## 2017-09-26 PROCEDURE — 96374 THER/PROPH/DIAG INJ IV PUSH: CPT

## 2017-09-26 PROCEDURE — 25000003 PHARM REV CODE 250: Performed by: INTERNAL MEDICINE

## 2017-09-26 PROCEDURE — 63600175 PHARM REV CODE 636 W HCPCS: Performed by: INTERNAL MEDICINE

## 2017-09-26 RX ORDER — ONDANSETRON 2 MG/ML
8 INJECTION INTRAMUSCULAR; INTRAVENOUS EVERY 4 HOURS PRN
Status: CANCELLED
Start: 2017-09-26

## 2017-09-26 RX ORDER — ONDANSETRON 2 MG/ML
8 INJECTION INTRAMUSCULAR; INTRAVENOUS EVERY 4 HOURS PRN
Status: DISCONTINUED | OUTPATIENT
Start: 2017-09-26 | End: 2017-09-26 | Stop reason: HOSPADM

## 2017-09-26 RX ORDER — SODIUM CHLORIDE 0.9 % (FLUSH) 0.9 %
10 SYRINGE (ML) INJECTION
Status: CANCELLED | OUTPATIENT
Start: 2017-09-26

## 2017-09-26 RX ORDER — HEPARIN SODIUM (PORCINE) LOCK FLUSH IV SOLN 100 UNIT/ML 100 UNIT/ML
100 SOLUTION INTRAVENOUS
Status: CANCELLED | OUTPATIENT
Start: 2017-09-26

## 2017-09-26 RX ADMIN — SODIUM CHLORIDE 1500 ML: 0.9 INJECTION, SOLUTION INTRAVENOUS at 09:09

## 2017-09-26 RX ADMIN — ONDANSETRON 8 MG: 2 INJECTION INTRAMUSCULAR; INTRAVENOUS at 09:09

## 2017-10-05 ENCOUNTER — INFUSION (OUTPATIENT)
Dept: INFECTIOUS DISEASES | Facility: HOSPITAL | Age: 37
End: 2017-10-05
Attending: INTERNAL MEDICINE
Payer: COMMERCIAL

## 2017-10-05 VITALS
DIASTOLIC BLOOD PRESSURE: 62 MMHG | HEIGHT: 58 IN | OXYGEN SATURATION: 98 % | SYSTOLIC BLOOD PRESSURE: 102 MMHG | BODY MASS INDEX: 25.12 KG/M2 | WEIGHT: 119.69 LBS | HEART RATE: 93 BPM | RESPIRATION RATE: 20 BRPM | TEMPERATURE: 99 F

## 2017-10-05 DIAGNOSIS — R11.0 NAUSEA: ICD-10-CM

## 2017-10-05 DIAGNOSIS — K31.84 GASTROPARESIS: ICD-10-CM

## 2017-10-05 DIAGNOSIS — R52 PAIN: Primary | ICD-10-CM

## 2017-10-05 PROCEDURE — 96360 HYDRATION IV INFUSION INIT: CPT

## 2017-10-05 PROCEDURE — 25000003 PHARM REV CODE 250: Performed by: INTERNAL MEDICINE

## 2017-10-05 PROCEDURE — 96361 HYDRATE IV INFUSION ADD-ON: CPT

## 2017-10-05 PROCEDURE — 63600175 PHARM REV CODE 636 W HCPCS: Performed by: INTERNAL MEDICINE

## 2017-10-05 RX ORDER — HEPARIN SODIUM (PORCINE) LOCK FLUSH IV SOLN 100 UNIT/ML 100 UNIT/ML
100 SOLUTION INTRAVENOUS
Status: CANCELLED | OUTPATIENT
Start: 2017-10-05

## 2017-10-05 RX ORDER — SODIUM CHLORIDE 0.9 % (FLUSH) 0.9 %
10 SYRINGE (ML) INJECTION
Status: CANCELLED | OUTPATIENT
Start: 2017-10-05

## 2017-10-05 RX ORDER — ONDANSETRON 2 MG/ML
8 INJECTION INTRAMUSCULAR; INTRAVENOUS EVERY 4 HOURS PRN
Status: DISCONTINUED | OUTPATIENT
Start: 2017-10-05 | End: 2017-10-05 | Stop reason: HOSPADM

## 2017-10-05 RX ORDER — ONDANSETRON 2 MG/ML
8 INJECTION INTRAMUSCULAR; INTRAVENOUS EVERY 4 HOURS PRN
Status: CANCELLED
Start: 2017-10-05

## 2017-10-05 RX ADMIN — SODIUM CHLORIDE 1500 ML: 0.9 INJECTION, SOLUTION INTRAVENOUS at 10:10

## 2017-10-05 RX ADMIN — ONDANSETRON 8 MG: 2 INJECTION, SOLUTION INTRAMUSCULAR; INTRAVENOUS at 10:10

## 2017-10-11 ENCOUNTER — INFUSION (OUTPATIENT)
Dept: INFECTIOUS DISEASES | Facility: HOSPITAL | Age: 37
End: 2017-10-11
Attending: INTERNAL MEDICINE
Payer: COMMERCIAL

## 2017-10-11 VITALS
WEIGHT: 121.38 LBS | SYSTOLIC BLOOD PRESSURE: 101 MMHG | TEMPERATURE: 99 F | DIASTOLIC BLOOD PRESSURE: 56 MMHG | HEART RATE: 94 BPM | BODY MASS INDEX: 25.48 KG/M2 | HEIGHT: 58 IN

## 2017-10-11 DIAGNOSIS — R52 PAIN: Primary | ICD-10-CM

## 2017-10-11 DIAGNOSIS — K31.84 GASTROPARESIS: ICD-10-CM

## 2017-10-11 DIAGNOSIS — R11.0 NAUSEA: ICD-10-CM

## 2017-10-11 PROCEDURE — 96360 HYDRATION IV INFUSION INIT: CPT

## 2017-10-11 PROCEDURE — 96375 TX/PRO/DX INJ NEW DRUG ADDON: CPT

## 2017-10-11 PROCEDURE — 96374 THER/PROPH/DIAG INJ IV PUSH: CPT

## 2017-10-11 PROCEDURE — 25000003 PHARM REV CODE 250: Performed by: INTERNAL MEDICINE

## 2017-10-11 PROCEDURE — 63600175 PHARM REV CODE 636 W HCPCS: Performed by: INTERNAL MEDICINE

## 2017-10-11 PROCEDURE — A4216 STERILE WATER/SALINE, 10 ML: HCPCS | Performed by: INTERNAL MEDICINE

## 2017-10-11 PROCEDURE — 96361 HYDRATE IV INFUSION ADD-ON: CPT

## 2017-10-11 RX ORDER — ONDANSETRON 2 MG/ML
8 INJECTION INTRAMUSCULAR; INTRAVENOUS EVERY 4 HOURS PRN
Status: DISCONTINUED | OUTPATIENT
Start: 2017-10-11 | End: 2017-10-11 | Stop reason: HOSPADM

## 2017-10-11 RX ORDER — SODIUM CHLORIDE 0.9 % (FLUSH) 0.9 %
10 SYRINGE (ML) INJECTION
Status: CANCELLED | OUTPATIENT
Start: 2017-10-11

## 2017-10-11 RX ORDER — ONDANSETRON 2 MG/ML
8 INJECTION INTRAMUSCULAR; INTRAVENOUS EVERY 4 HOURS PRN
Status: CANCELLED
Start: 2017-10-11

## 2017-10-11 RX ORDER — HEPARIN SODIUM (PORCINE) LOCK FLUSH IV SOLN 100 UNIT/ML 100 UNIT/ML
100 SOLUTION INTRAVENOUS
Status: CANCELLED | OUTPATIENT
Start: 2017-10-11

## 2017-10-11 RX ORDER — SODIUM CHLORIDE 0.9 % (FLUSH) 0.9 %
10 SYRINGE (ML) INJECTION
Status: DISCONTINUED | OUTPATIENT
Start: 2017-10-11 | End: 2017-10-11 | Stop reason: HOSPADM

## 2017-10-11 RX ADMIN — Medication 10 ML: at 08:10

## 2017-10-11 RX ADMIN — SODIUM CHLORIDE 1500 ML: 0.9 INJECTION, SOLUTION INTRAVENOUS at 08:10

## 2017-10-11 RX ADMIN — ONDANSETRON 8 MG: 2 INJECTION, SOLUTION INTRAMUSCULAR; INTRAVENOUS at 08:10

## 2017-10-11 NOTE — PLAN OF CARE
Problem: Patient Care Overview  Goal: Individualization & Mutuality  Outcome: Ongoing (interventions implemented as appropriate)  Pt verbalized understanding of hand washing and infection control

## 2017-10-18 ENCOUNTER — INFUSION (OUTPATIENT)
Dept: INFECTIOUS DISEASES | Facility: HOSPITAL | Age: 37
End: 2017-10-18
Attending: INTERNAL MEDICINE
Payer: COMMERCIAL

## 2017-10-18 VITALS
TEMPERATURE: 99 F | DIASTOLIC BLOOD PRESSURE: 73 MMHG | HEART RATE: 87 BPM | BODY MASS INDEX: 24.51 KG/M2 | HEIGHT: 58 IN | WEIGHT: 116.75 LBS | SYSTOLIC BLOOD PRESSURE: 112 MMHG

## 2017-10-18 DIAGNOSIS — R52 PAIN: Primary | ICD-10-CM

## 2017-10-18 DIAGNOSIS — K31.84 GASTROPARESIS: ICD-10-CM

## 2017-10-18 DIAGNOSIS — R11.0 NAUSEA: ICD-10-CM

## 2017-10-18 PROCEDURE — 96375 TX/PRO/DX INJ NEW DRUG ADDON: CPT

## 2017-10-18 PROCEDURE — 96361 HYDRATE IV INFUSION ADD-ON: CPT

## 2017-10-18 PROCEDURE — 96360 HYDRATION IV INFUSION INIT: CPT

## 2017-10-18 PROCEDURE — 63600175 PHARM REV CODE 636 W HCPCS: Performed by: INTERNAL MEDICINE

## 2017-10-18 PROCEDURE — 25000003 PHARM REV CODE 250: Performed by: INTERNAL MEDICINE

## 2017-10-18 RX ORDER — ONDANSETRON 2 MG/ML
8 INJECTION INTRAMUSCULAR; INTRAVENOUS EVERY 4 HOURS PRN
Status: DISCONTINUED | OUTPATIENT
Start: 2017-10-18 | End: 2017-10-18 | Stop reason: HOSPADM

## 2017-10-18 RX ORDER — HEPARIN SODIUM (PORCINE) LOCK FLUSH IV SOLN 100 UNIT/ML 100 UNIT/ML
100 SOLUTION INTRAVENOUS
Status: CANCELLED | OUTPATIENT
Start: 2017-10-18

## 2017-10-18 RX ORDER — ONDANSETRON 2 MG/ML
8 INJECTION INTRAMUSCULAR; INTRAVENOUS EVERY 4 HOURS PRN
Status: CANCELLED
Start: 2017-10-18

## 2017-10-18 RX ORDER — SODIUM CHLORIDE 0.9 % (FLUSH) 0.9 %
10 SYRINGE (ML) INJECTION
Status: CANCELLED | OUTPATIENT
Start: 2017-10-18

## 2017-10-18 RX ADMIN — SODIUM CHLORIDE 1500 ML: 0.9 INJECTION, SOLUTION INTRAVENOUS at 08:10

## 2017-10-18 RX ADMIN — ONDANSETRON 8 MG: 2 INJECTION, SOLUTION INTRAMUSCULAR; INTRAVENOUS at 08:10

## 2017-10-24 ENCOUNTER — INFUSION (OUTPATIENT)
Dept: INFECTIOUS DISEASES | Facility: HOSPITAL | Age: 37
End: 2017-10-24
Attending: INTERNAL MEDICINE
Payer: COMMERCIAL

## 2017-10-24 VITALS
BODY MASS INDEX: 24.55 KG/M2 | OXYGEN SATURATION: 98 % | SYSTOLIC BLOOD PRESSURE: 92 MMHG | WEIGHT: 116.94 LBS | HEIGHT: 58 IN | HEART RATE: 87 BPM | TEMPERATURE: 98 F | DIASTOLIC BLOOD PRESSURE: 51 MMHG

## 2017-10-24 DIAGNOSIS — K31.84 GASTROPARESIS: ICD-10-CM

## 2017-10-24 DIAGNOSIS — R11.0 NAUSEA: ICD-10-CM

## 2017-10-24 DIAGNOSIS — R52 PAIN: Primary | ICD-10-CM

## 2017-10-24 PROCEDURE — 96374 THER/PROPH/DIAG INJ IV PUSH: CPT

## 2017-10-24 PROCEDURE — 63600175 PHARM REV CODE 636 W HCPCS: Performed by: INTERNAL MEDICINE

## 2017-10-24 PROCEDURE — 96361 HYDRATE IV INFUSION ADD-ON: CPT

## 2017-10-24 PROCEDURE — 25000003 PHARM REV CODE 250: Performed by: INTERNAL MEDICINE

## 2017-10-24 RX ORDER — ONDANSETRON 2 MG/ML
8 INJECTION INTRAMUSCULAR; INTRAVENOUS EVERY 4 HOURS PRN
Status: DISCONTINUED | OUTPATIENT
Start: 2017-10-24 | End: 2017-10-24 | Stop reason: HOSPADM

## 2017-10-24 RX ORDER — HEPARIN SODIUM (PORCINE) LOCK FLUSH IV SOLN 100 UNIT/ML 100 UNIT/ML
100 SOLUTION INTRAVENOUS
Status: CANCELLED | OUTPATIENT
Start: 2017-10-24

## 2017-10-24 RX ORDER — SODIUM CHLORIDE 0.9 % (FLUSH) 0.9 %
10 SYRINGE (ML) INJECTION
Status: CANCELLED | OUTPATIENT
Start: 2017-10-24

## 2017-10-24 RX ORDER — ONDANSETRON 2 MG/ML
8 INJECTION INTRAMUSCULAR; INTRAVENOUS EVERY 4 HOURS PRN
Status: CANCELLED
Start: 2017-10-24

## 2017-10-24 RX ADMIN — SODIUM CHLORIDE 1500 ML: 0.9 INJECTION, SOLUTION INTRAVENOUS at 08:10

## 2017-10-24 RX ADMIN — ONDANSETRON 8 MG: 2 INJECTION, SOLUTION INTRAMUSCULAR; INTRAVENOUS at 08:10

## 2017-11-15 ENCOUNTER — HOSPITAL ENCOUNTER (OUTPATIENT)
Dept: RADIOLOGY | Facility: OTHER | Age: 37
Discharge: HOME | End: 2017-11-15
Attending: INTERNAL MEDICINE
Payer: COMMERCIAL

## 2017-11-15 DIAGNOSIS — R07.81 RIB PAIN: ICD-10-CM

## 2017-11-15 PROCEDURE — 71101 X-RAY EXAM UNILAT RIBS/CHEST: CPT | Mod: 26,,, | Performed by: RADIOLOGY

## 2017-11-15 PROCEDURE — 71101 X-RAY EXAM UNILAT RIBS/CHEST: CPT | Mod: TC

## 2017-12-04 ENCOUNTER — OFFICE VISIT (OUTPATIENT)
Dept: PAIN MEDICINE | Facility: CLINIC | Age: 37
End: 2017-12-04
Attending: INTERNAL MEDICINE
Payer: COMMERCIAL

## 2017-12-04 VITALS
HEART RATE: 100 BPM | DIASTOLIC BLOOD PRESSURE: 74 MMHG | BODY MASS INDEX: 25.45 KG/M2 | SYSTOLIC BLOOD PRESSURE: 107 MMHG | TEMPERATURE: 98 F | WEIGHT: 121.25 LBS | HEIGHT: 58 IN | RESPIRATION RATE: 18 BRPM

## 2017-12-04 DIAGNOSIS — R07.81 RIB PAIN: ICD-10-CM

## 2017-12-04 DIAGNOSIS — K31.84 GASTROPARESIS: Primary | ICD-10-CM

## 2017-12-04 DIAGNOSIS — M79.10 MYALGIA: ICD-10-CM

## 2017-12-04 PROCEDURE — 99999 PR PBB SHADOW E&M-EST. PATIENT-LVL III: CPT | Mod: PBBFAC,,, | Performed by: ANESTHESIOLOGY

## 2017-12-04 PROCEDURE — 99243 OFF/OP CNSLTJ NEW/EST LOW 30: CPT | Mod: S$GLB,,, | Performed by: ANESTHESIOLOGY

## 2017-12-04 RX ORDER — CYCLOBENZAPRINE HCL 10 MG
10 TABLET ORAL 3 TIMES DAILY PRN
Qty: 30 TABLET | Refills: 0 | Status: SHIPPED | OUTPATIENT
Start: 2017-12-04 | End: 2017-12-14

## 2017-12-04 RX ORDER — HYDROCODONE BITARTRATE AND ACETAMINOPHEN 10; 325 MG/1; MG/1
1 TABLET ORAL EVERY 8 HOURS PRN
Qty: 30 TABLET | Refills: 0 | Status: SHIPPED | OUTPATIENT
Start: 2017-12-04 | End: 2018-10-05

## 2017-12-04 RX ORDER — CELECOXIB 200 MG/1
CAPSULE ORAL
Qty: 30 CAPSULE | Refills: 0 | Status: SHIPPED | OUTPATIENT
Start: 2017-12-04 | End: 2017-12-08

## 2017-12-04 NOTE — PROGRESS NOTES
Chronic Pain - New Consult    Referring Physician: Liz Alvarado, *    Chief Complaint:   Chief Complaint   Patient presents with    Chest Pain        SUBJECTIVE: Disclaimer: This note has been generated using voice-recognition software. There may be typographical errors that have been missed during proof-reading    Initial encounter:    Tiffanie Wharton presents to the clinic for the evaluation of chest wall pain. The pain started November 9, 2017 and symptoms have been worsening.    Brief history:  Anterior rib pain underneath the breast the left side with point tenderness.  This occurred after a aggressive hug after meeting a friend.  It has persisted at this point.  She has tried meloxicam for several days but it irritated her stomach and it was discontinued.  The medication was helping at the time that she was taking it.  She tried Voltaren gel in the past for foot fracture which also caused stomach upset.  Hydrocodone acetaminophen is helping alleviate her pain she takes it intermittently.  Flexeril is helping alleviate her pain but she takes it intermittently.  She has not had any injections.  Rib x-rays did not reveal any fracture.    Pain Description:    The pain is located in the chest wall area       At BEST  2/10     At WORST  7/10 on the WORST day.      On average pain is rated as 4/10.     Today the pain is rated as 5/10    The pain is described as aching, sharp and throbbing      Symptoms interfere with daily activity.     Exacerbating factors: Bending, Coughing/Sneezing, Extension and Flexing.      Mitigating factors heat, ice, medications and rest.     Patient denies night fever/night sweats, urinary incontinence, bowel incontinence, significant weight loss, significant motor weakness and loss of sensations.  Patient denies any suicidal or homicidal ideations    Pain Medications:  Current:  Norco    Tried in Past:  NSAIDs -Never  TCA -Never  SNRI -Never  Anti-convulsants -Never  Muscle  Relaxants -Never  Opioids-Never    Physical Therapy/Home Exercise: no       report:  Reviewed and consistent with medication use as prescribed.    Pain Procedures: none    Chiropractor -never  Acupuncture - never  TENS unit -never  Spinal decompression -never  Joint replacement -never    Imaging:     XRAY 11/15/2017  Narrative     HISTORY: Chest pain    TECHNIQUE: 2 view radiographs of the left ribs     COMPARISON: Chest radiographs 9/23/08      FINDINGS:     Fracture: None.    Soft Tissues: Normal.     Other: No large pulmonary consolidation, pleural effusion, or pneumothorax. 2 surgical clips overlie the left upper quadrant.   Impression       No fracture.         Past Medical History:   Diagnosis Date    ADHD (attention deficit hyperactivity disorder)     Crohn's disease     Gastroparesis      Past Surgical History:   Procedure Laterality Date    ABDOMINAL SURGERY       Social History     Social History    Marital status: Single     Spouse name: N/A    Number of children: N/A    Years of education: N/A     Occupational History    Not on file.     Social History Main Topics    Smoking status: Former Smoker     Quit date: 1/1/2009    Smokeless tobacco: Never Used    Alcohol use No    Drug use:      Types: Marijuana    Sexual activity: Yes     Partners: Female     Other Topics Concern    Not on file     Social History Narrative    No narrative on file     Family History   Problem Relation Age of Onset    Ovarian cancer Other     Ovarian cancer Other     Breast cancer Neg Hx        Review of patient's allergies indicates:   Allergen Reactions    Penicillins Rash       Current Outpatient Prescriptions   Medication Sig    dextroamphetamine (DEXEDRINE SPANSULE) 10 MG 24 hr capsule Take 10 mg by mouth once daily.    DOMPERIDONE, BULK, MISC by Misc.(Non-Drug; Combo Route) route.    escitalopram oxalate (LEXAPRO) 10 MG tablet Take 1 tablet by mouth once daily.    esomeprazole (NEXIUM) 40 MG  capsule Take 40 mg by mouth before breakfast.    hydrocodone-acetaminophen 10-325mg (NORCO)  mg Tab Take 1 tablet by mouth every 8 (eight) hours as needed for Pain.    meloxicam (MOBIC) 7.5 MG tablet TAKE 1 TO 2 TABLETS BY MOUTH EVERY DAY as needed    trazodone (DESYREL) 25 MG tablet Take by mouth every evening.    celecoxib (CELEBREX) 200 MG capsule Take 1 a day for 3 days, if not sufficient increase to twice a day    clobetasol (TEMOVATE) 0.05 % cream 1 application 2 (two) times daily. Apply to affected area    clonazePAM (KLONOPIN) 0.5 MG tablet Take 0.5 mg by mouth as needed.     cyclobenzaprine (FLEXERIL) 10 MG tablet     cyclobenzaprine (FLEXERIL) 10 MG tablet Take 1 tablet (10 mg total) by mouth 3 (three) times daily as needed for Muscle spasms.    EPIPEN 2-BISI 0.3 mg/0.3 mL AtIn INJECT 0.3 MLS (0.3 MG TOTAL) INTO THE MUSCLE ONCE.    fluconazole (DIFLUCAN) 200 MG Tab Take one tab by mouth today and repeat dose every third day until completed    lamotrigine (LAMICTAL) 100 MG tablet Take 100 mg by mouth once daily.    lidocaine (XYLOCAINE) 5 % Oint ointment Apply a thin film (1/3 a pencil eraser), Twice a day, used a cotton ball at the hour sleep    predniSONE (DELTASONE) 20 MG tablet     PREVIDENT 5000 BOOSTER PLUS 1.1 % Pste AS DIRECTED    promethazine (PHENERGAN) 25 MG tablet Take 25 mg by mouth every 4 (four) hours.    tacrolimus (PROTOPIC) 0.1 % ointment 1 application 2 (two) times daily. Apply to skin     Current Facility-Administered Medications   Medication    0.9 % NaCl with KCl 20 mEq infusion    dexamethasone injection 8 mg    ondansetron HCl (PF) 4 mg/2 mL injection 8 mg    ondansetron HCl (PF) 4 mg/2 mL injection 8 mg    ondansetron HCl (PF) 4 mg/2 mL injection 8 mg    ondansetron HCl (PF) 4 mg/2 mL injection 8 mg    ondansetron HCl (PF) 4 mg/2 mL injection 8 mg    ondansetron HCl (PF) 4 mg/2 mL injection 8 mg    ondansetron HCl (PF) 4 mg/2 mL injection 8 mg     promethazine (PHENERGAN) 12.5 mg in dextrose 5 % 50 mL IVPB    promethazine (PHENERGAN) 12.5 mg in dextrose 5 % 50 mL IVPB    promethazine (PHENERGAN) 12.5 mg in dextrose 5 % 50 mL IVPB    promethazine (PHENERGAN) 12.5 mg in dextrose 5 % 50 mL IVPB    promethazine (PHENERGAN) 12.5 mg in dextrose 5 % 50 mL IVPB    promethazine (PHENERGAN) 12.5 mg in dextrose 5 % 50 mL IVPB    promethazine (PHENERGAN) 12.5 mg in dextrose 5 % 50 mL IVPB    promethazine (PHENERGAN) 12.5 mg in dextrose 5 % 50 mL IVPB    promethazine (PHENERGAN) 12.5 mg in dextrose 5 % 50 mL IVPB    promethazine (PHENERGAN) 12.5 mg in dextrose 5 % 50 mL IVPB    promethazine (PHENERGAN) 12.5 mg in dextrose 5 % 50 mL IVPB    promethazine (PHENERGAN) 12.5 mg in dextrose 5 % 50 mL IVPB    promethazine (PHENERGAN) 12.5 mg in dextrose 5 % 50 mL IVPB    promethazine (PHENERGAN) 12.5 mg in dextrose 5 % 50 mL IVPB    promethazine (PHENERGAN) 12.5 mg in dextrose 5 % 50 mL IVPB    promethazine (PHENERGAN) 12.5 mg in sodium chloride 0.9% IVPB    promethazine (PHENERGAN) 12.5 mg in sodium chloride 0.9% IVPB    promethazine injection 12.5 mg    sodium chloride 0.9% bolus 1,000 mL    sodium chloride 0.9% bolus 1,500 mL       REVIEW OF SYSTEMS:    GENERAL:  No weight loss, malaise or fevers.  HEENT:   No recent changes in vision or hearing  NECK:  Negative for lumps, no difficulty with swallowing.  RESPIRATORY:  Negative for cough, wheezing or shortness of breath, patient denies any recent URI.  CARDIOVASCULAR:  Negative for chest pain, leg swelling or palpitations.  GI:   History of gastroparesis, no current changes, no blood in stool.  MUSCULOSKELETAL:  See HPI.  SKIN:  Negative for lesions, rash, and itching.  PSYCH:  No mood disorder or recent psychosocial stressors.  Patients sleep is not disturbed secondary to pain.  HEMATOLOGY/LYMPHOLOGY:  Negative for prolonged bleeding, bruising easily or swollen nodes.  Patient is not currently taking any  "anti-coagulants  ENDO: No history of diabetes or thyroid dysfunction  NEURO:   No history of headaches, syncope, paralysis, seizures or tremors.  All other reviewed and negative other than HPI.    OBJECTIVE:    /74   Pulse 100   Temp 98.4 °F (36.9 °C) (Oral)   Resp 18   Ht 4' 10" (1.473 m)   Wt 55 kg (121 lb 4.1 oz)   BMI 25.34 kg/m²     PHYSICAL EXAMINATION:    GENERAL: Well appearing, in no acute distress, alert and oriented x3.  PSYCH:  Mood and affect appropriate.  SKIN: Skin color, texture, turgor normal, no rashes or lesions.  HEAD/FACE:  Normocephalic, atraumatic. Cranial nerves grossly intact.  NECK: No pain to palpation over the cervical paraspinous muscles. Spurling Negative. No pain with neck flexion, extension, or lateral flexion.   CV: RRR with palpation of the radial artery.  PULM: No evidence of respiratory difficulty, symmetric chest rise.  GI:  Soft and non-tender.  BACK: Straight leg raising in the supine position is negative to radicular pain. No pain to palpation over the facet joints of the lumbar spine or spinous processes. Normal range of motion without pain reproduction.  EXTREMITIES: Peripheral joint ROM is full and pain free without obvious instability or laxity in all four extremities. No deformities, edema, or skin discoloration. Good capillary refill.  MUSCULOSKELETAL:  Point tenderness over the rib and intercostal space underneath the left breast in one place, no radiation.  NEURO: Bilateral upper and lower extremity coordination and muscle stretch reflexes are physiologic and symmetric.  Plantar response are downgoing. No clonus.  No loss of sensation is noted.  GAIT: normal.    ASSESSMENT: 37 y.o. year old female with pain, consistent with     Encounter Diagnoses   Name Primary?    Gastroparesis Yes    Rib pain     Myalgia        PLAN:   Celebrex 200 mg per day for 3 days then increase to twice a day if ineffective.      In the future we can consider a topical pain " cream.    Heat or ice/massage.    Follow-up in 2 weeks or sooner if pain worsens, if there is no improvement we will perform a trigger point injection in the area of pain.    If there is still no improvement we will obtain a CT scan of the chest to evaluate for missed rib fracture    The above plan and management options were discussed at length with patient. Patient is in agreement with the above and verbalized understanding. It will be communicated with the referring physician via electronic record, fax, or mail.    Kiko Dupree  12/04/2017

## 2017-12-05 ENCOUNTER — PATIENT MESSAGE (OUTPATIENT)
Dept: PAIN MEDICINE | Facility: CLINIC | Age: 37
End: 2017-12-05

## 2017-12-13 RX ORDER — HEPARIN SODIUM (PORCINE) LOCK FLUSH IV SOLN 100 UNIT/ML 100 UNIT/ML
100 SOLUTION INTRAVENOUS
Status: CANCELLED | OUTPATIENT
Start: 2017-12-13

## 2017-12-13 RX ORDER — SODIUM CHLORIDE 0.9 % (FLUSH) 0.9 %
10 SYRINGE (ML) INJECTION
Status: CANCELLED | OUTPATIENT
Start: 2017-12-13

## 2017-12-13 RX ORDER — ONDANSETRON 2 MG/ML
8 INJECTION INTRAMUSCULAR; INTRAVENOUS EVERY 4 HOURS PRN
Status: CANCELLED
Start: 2017-12-13

## 2017-12-18 ENCOUNTER — INFUSION (OUTPATIENT)
Dept: INFECTIOUS DISEASES | Facility: HOSPITAL | Age: 37
End: 2017-12-18
Attending: INTERNAL MEDICINE
Payer: COMMERCIAL

## 2017-12-18 VITALS
SYSTOLIC BLOOD PRESSURE: 107 MMHG | DIASTOLIC BLOOD PRESSURE: 68 MMHG | HEIGHT: 58 IN | BODY MASS INDEX: 25.12 KG/M2 | RESPIRATION RATE: 16 BRPM | HEART RATE: 97 BPM | WEIGHT: 119.69 LBS | OXYGEN SATURATION: 99 % | TEMPERATURE: 99 F

## 2017-12-18 DIAGNOSIS — K31.84 GASTROPARESIS: ICD-10-CM

## 2017-12-18 DIAGNOSIS — R52 PAIN: Primary | ICD-10-CM

## 2017-12-18 DIAGNOSIS — R11.0 NAUSEA: ICD-10-CM

## 2017-12-18 PROCEDURE — 96361 HYDRATE IV INFUSION ADD-ON: CPT

## 2017-12-18 PROCEDURE — 25000003 PHARM REV CODE 250

## 2017-12-18 PROCEDURE — 96360 HYDRATION IV INFUSION INIT: CPT

## 2017-12-18 RX ORDER — SODIUM CHLORIDE 0.9 % (FLUSH) 0.9 %
10 SYRINGE (ML) INJECTION
Status: CANCELLED | OUTPATIENT
Start: 2017-12-18

## 2017-12-18 RX ORDER — HEPARIN SODIUM (PORCINE) LOCK FLUSH IV SOLN 100 UNIT/ML 100 UNIT/ML
100 SOLUTION INTRAVENOUS
Status: CANCELLED | OUTPATIENT
Start: 2017-12-18

## 2017-12-18 RX ORDER — ONDANSETRON 2 MG/ML
8 INJECTION INTRAMUSCULAR; INTRAVENOUS EVERY 4 HOURS PRN
Status: DISCONTINUED | OUTPATIENT
Start: 2017-12-18 | End: 2017-12-18 | Stop reason: HOSPADM

## 2017-12-18 RX ORDER — ONDANSETRON 2 MG/ML
8 INJECTION INTRAMUSCULAR; INTRAVENOUS EVERY 4 HOURS PRN
Status: CANCELLED
Start: 2017-12-18

## 2017-12-18 RX ADMIN — SODIUM CHLORIDE 1500 ML: 0.9 INJECTION, SOLUTION INTRAVENOUS at 11:12

## 2017-12-26 ENCOUNTER — INFUSION (OUTPATIENT)
Dept: INFECTIOUS DISEASES | Facility: HOSPITAL | Age: 37
End: 2017-12-26
Attending: INTERNAL MEDICINE
Payer: COMMERCIAL

## 2017-12-26 VITALS
SYSTOLIC BLOOD PRESSURE: 116 MMHG | BODY MASS INDEX: 25.39 KG/M2 | HEART RATE: 100 BPM | RESPIRATION RATE: 18 BRPM | DIASTOLIC BLOOD PRESSURE: 65 MMHG | TEMPERATURE: 98 F | HEIGHT: 58 IN | OXYGEN SATURATION: 99 % | WEIGHT: 120.94 LBS

## 2017-12-26 DIAGNOSIS — R11.0 NAUSEA: ICD-10-CM

## 2017-12-26 DIAGNOSIS — R52 PAIN: Primary | ICD-10-CM

## 2017-12-26 DIAGNOSIS — K31.84 GASTROPARESIS: ICD-10-CM

## 2017-12-26 PROCEDURE — 96365 THER/PROPH/DIAG IV INF INIT: CPT

## 2017-12-26 PROCEDURE — 25000003 PHARM REV CODE 250

## 2017-12-26 PROCEDURE — 96366 THER/PROPH/DIAG IV INF ADDON: CPT

## 2017-12-26 RX ORDER — HEPARIN SODIUM (PORCINE) LOCK FLUSH IV SOLN 100 UNIT/ML 100 UNIT/ML
100 SOLUTION INTRAVENOUS
Status: CANCELLED | OUTPATIENT
Start: 2017-12-26

## 2017-12-26 RX ORDER — SODIUM CHLORIDE 0.9 % (FLUSH) 0.9 %
10 SYRINGE (ML) INJECTION
Status: CANCELLED | OUTPATIENT
Start: 2017-12-26

## 2017-12-26 RX ORDER — ONDANSETRON 2 MG/ML
8 INJECTION INTRAMUSCULAR; INTRAVENOUS EVERY 4 HOURS PRN
Status: DISCONTINUED | OUTPATIENT
Start: 2017-12-26 | End: 2017-12-26 | Stop reason: HOSPADM

## 2017-12-26 RX ORDER — ONDANSETRON 2 MG/ML
8 INJECTION INTRAMUSCULAR; INTRAVENOUS EVERY 4 HOURS PRN
Status: CANCELLED
Start: 2017-12-26

## 2017-12-26 RX ADMIN — SODIUM CHLORIDE 1500 ML: 0.9 INJECTION, SOLUTION INTRAVENOUS at 11:12

## 2017-12-26 NOTE — PLAN OF CARE
Problem: Patient Care Overview (Adult)  Goal: Plan of Care Review  Outcome: Ongoing (interventions implemented as appropriate)  Pt educated on hand hygiene and infection control.

## 2017-12-26 NOTE — PROGRESS NOTES
Pt tolerated Normal Saline infusion at 350ml/hr, no C/O pain/discomfort, pt instructed to call with any questions/concerns related to infusion therapy, pt verbalized understanding.

## 2018-01-02 RX ORDER — CELECOXIB 200 MG/1
CAPSULE ORAL
Qty: 30 CAPSULE | Refills: 0 | OUTPATIENT
Start: 2018-01-02

## 2018-01-04 ENCOUNTER — INFUSION (OUTPATIENT)
Dept: INFECTIOUS DISEASES | Facility: HOSPITAL | Age: 38
End: 2018-01-04
Attending: INTERNAL MEDICINE
Payer: COMMERCIAL

## 2018-01-04 VITALS
TEMPERATURE: 99 F | HEART RATE: 109 BPM | RESPIRATION RATE: 15 BRPM | DIASTOLIC BLOOD PRESSURE: 68 MMHG | BODY MASS INDEX: 24.79 KG/M2 | SYSTOLIC BLOOD PRESSURE: 111 MMHG | WEIGHT: 118.63 LBS

## 2018-01-04 DIAGNOSIS — K31.84 GASTROPARESIS: ICD-10-CM

## 2018-01-04 DIAGNOSIS — R52 PAIN: Primary | ICD-10-CM

## 2018-01-04 DIAGNOSIS — R11.0 NAUSEA: ICD-10-CM

## 2018-01-04 PROCEDURE — 25000003 PHARM REV CODE 250

## 2018-01-04 PROCEDURE — 96361 HYDRATE IV INFUSION ADD-ON: CPT

## 2018-01-04 PROCEDURE — A4216 STERILE WATER/SALINE, 10 ML: HCPCS

## 2018-01-04 PROCEDURE — 96360 HYDRATION IV INFUSION INIT: CPT

## 2018-01-04 RX ORDER — SODIUM CHLORIDE 0.9 % (FLUSH) 0.9 %
10 SYRINGE (ML) INJECTION
Status: CANCELLED | OUTPATIENT
Start: 2018-01-04

## 2018-01-04 RX ORDER — HEPARIN SODIUM (PORCINE) LOCK FLUSH IV SOLN 100 UNIT/ML 100 UNIT/ML
100 SOLUTION INTRAVENOUS
Status: DISCONTINUED | OUTPATIENT
Start: 2018-01-04 | End: 2018-01-04 | Stop reason: HOSPADM

## 2018-01-04 RX ORDER — HEPARIN SODIUM (PORCINE) LOCK FLUSH IV SOLN 100 UNIT/ML 100 UNIT/ML
100 SOLUTION INTRAVENOUS
Status: CANCELLED | OUTPATIENT
Start: 2018-01-04

## 2018-01-04 RX ORDER — ONDANSETRON 2 MG/ML
8 INJECTION INTRAMUSCULAR; INTRAVENOUS EVERY 4 HOURS PRN
Status: CANCELLED
Start: 2018-01-04

## 2018-01-04 RX ORDER — SODIUM CHLORIDE 0.9 % (FLUSH) 0.9 %
10 SYRINGE (ML) INJECTION
Status: DISCONTINUED | OUTPATIENT
Start: 2018-01-04 | End: 2018-01-04 | Stop reason: HOSPADM

## 2018-01-04 RX ORDER — ONDANSETRON 2 MG/ML
8 INJECTION INTRAMUSCULAR; INTRAVENOUS EVERY 4 HOURS PRN
Status: DISCONTINUED | OUTPATIENT
Start: 2018-01-04 | End: 2018-01-04 | Stop reason: HOSPADM

## 2018-01-04 RX ADMIN — SODIUM CHLORIDE 1500 ML: 900 INJECTION, SOLUTION INTRAVENOUS at 10:01

## 2018-01-04 RX ADMIN — Medication 10 ML: at 09:01

## 2018-01-09 ENCOUNTER — INFUSION (OUTPATIENT)
Dept: INFECTIOUS DISEASES | Facility: HOSPITAL | Age: 38
End: 2018-01-09
Attending: INTERNAL MEDICINE
Payer: COMMERCIAL

## 2018-01-09 VITALS
DIASTOLIC BLOOD PRESSURE: 64 MMHG | BODY MASS INDEX: 24.65 KG/M2 | OXYGEN SATURATION: 97 % | RESPIRATION RATE: 16 BRPM | SYSTOLIC BLOOD PRESSURE: 114 MMHG | TEMPERATURE: 98 F | WEIGHT: 117.94 LBS | HEART RATE: 99 BPM

## 2018-01-09 DIAGNOSIS — R11.0 NAUSEA: ICD-10-CM

## 2018-01-09 DIAGNOSIS — K31.84 GASTROPARESIS: ICD-10-CM

## 2018-01-09 DIAGNOSIS — R52 PAIN: Primary | ICD-10-CM

## 2018-01-09 PROCEDURE — 96361 HYDRATE IV INFUSION ADD-ON: CPT

## 2018-01-09 PROCEDURE — 96360 HYDRATION IV INFUSION INIT: CPT

## 2018-01-09 PROCEDURE — 25000003 PHARM REV CODE 250

## 2018-01-09 RX ORDER — HEPARIN SODIUM (PORCINE) LOCK FLUSH IV SOLN 100 UNIT/ML 100 UNIT/ML
100 SOLUTION INTRAVENOUS
Status: CANCELLED | OUTPATIENT
Start: 2018-01-09

## 2018-01-09 RX ORDER — ONDANSETRON 2 MG/ML
8 INJECTION INTRAMUSCULAR; INTRAVENOUS EVERY 4 HOURS PRN
Status: DISCONTINUED | OUTPATIENT
Start: 2018-01-09 | End: 2018-01-09 | Stop reason: HOSPADM

## 2018-01-09 RX ORDER — ONDANSETRON 2 MG/ML
8 INJECTION INTRAMUSCULAR; INTRAVENOUS EVERY 4 HOURS PRN
Status: CANCELLED
Start: 2018-01-09

## 2018-01-09 RX ORDER — SODIUM CHLORIDE 0.9 % (FLUSH) 0.9 %
10 SYRINGE (ML) INJECTION
Status: CANCELLED | OUTPATIENT
Start: 2018-01-09

## 2018-01-09 RX ADMIN — SODIUM CHLORIDE 1500 ML: 0.9 INJECTION, SOLUTION INTRAVENOUS at 08:01

## 2018-01-09 NOTE — PROGRESS NOTES
Pt arrived to infusion room, iv initiated then infusion of 1.5 Liters normal saline over 5 hours Pt tolerated the rest of the infusion well and left in NAD.

## 2018-01-17 ENCOUNTER — INFUSION (OUTPATIENT)
Dept: INFECTIOUS DISEASES | Facility: HOSPITAL | Age: 38
End: 2018-01-17
Attending: EMERGENCY MEDICINE
Payer: COMMERCIAL

## 2018-01-17 VITALS
BODY MASS INDEX: 24.32 KG/M2 | RESPIRATION RATE: 16 BRPM | HEART RATE: 96 BPM | OXYGEN SATURATION: 99 % | DIASTOLIC BLOOD PRESSURE: 68 MMHG | TEMPERATURE: 98 F | HEIGHT: 58 IN | SYSTOLIC BLOOD PRESSURE: 113 MMHG | WEIGHT: 115.88 LBS

## 2018-01-17 DIAGNOSIS — R52 PAIN: Primary | ICD-10-CM

## 2018-01-17 DIAGNOSIS — R11.0 NAUSEA: ICD-10-CM

## 2018-01-17 DIAGNOSIS — K31.84 GASTROPARESIS: ICD-10-CM

## 2018-01-17 PROCEDURE — 96361 HYDRATE IV INFUSION ADD-ON: CPT

## 2018-01-17 PROCEDURE — 96360 HYDRATION IV INFUSION INIT: CPT

## 2018-01-17 PROCEDURE — 25000003 PHARM REV CODE 250: Performed by: INTERNAL MEDICINE

## 2018-01-17 RX ORDER — ONDANSETRON 2 MG/ML
8 INJECTION INTRAMUSCULAR; INTRAVENOUS EVERY 4 HOURS PRN
Status: CANCELLED
Start: 2018-01-17

## 2018-01-17 RX ORDER — HEPARIN SODIUM (PORCINE) LOCK FLUSH IV SOLN 100 UNIT/ML 100 UNIT/ML
100 SOLUTION INTRAVENOUS
Status: CANCELLED | OUTPATIENT
Start: 2018-01-17

## 2018-01-17 RX ORDER — HEPARIN SODIUM (PORCINE) LOCK FLUSH IV SOLN 100 UNIT/ML 100 UNIT/ML
100 SOLUTION INTRAVENOUS
Status: DISCONTINUED | OUTPATIENT
Start: 2018-01-17 | End: 2018-01-17 | Stop reason: HOSPADM

## 2018-01-17 RX ORDER — ONDANSETRON 2 MG/ML
8 INJECTION INTRAMUSCULAR; INTRAVENOUS EVERY 4 HOURS PRN
Status: DISCONTINUED | OUTPATIENT
Start: 2018-01-17 | End: 2018-01-17 | Stop reason: HOSPADM

## 2018-01-17 RX ORDER — SODIUM CHLORIDE 0.9 % (FLUSH) 0.9 %
10 SYRINGE (ML) INJECTION
Status: DISCONTINUED | OUTPATIENT
Start: 2018-01-17 | End: 2018-01-17 | Stop reason: HOSPADM

## 2018-01-17 RX ORDER — SODIUM CHLORIDE 0.9 % (FLUSH) 0.9 %
10 SYRINGE (ML) INJECTION
Status: CANCELLED | OUTPATIENT
Start: 2018-01-17

## 2018-01-17 RX ADMIN — SODIUM CHLORIDE 1500 ML: 900 INJECTION, SOLUTION INTRAVENOUS at 10:01

## 2018-10-08 ENCOUNTER — INFUSION (OUTPATIENT)
Dept: INFUSION THERAPY | Facility: OTHER | Age: 38
End: 2018-10-08
Attending: INTERNAL MEDICINE
Payer: COMMERCIAL

## 2018-10-08 VITALS
HEART RATE: 104 BPM | TEMPERATURE: 99 F | BODY MASS INDEX: 26.88 KG/M2 | RESPIRATION RATE: 17 BRPM | DIASTOLIC BLOOD PRESSURE: 75 MMHG | OXYGEN SATURATION: 98 % | WEIGHT: 128.06 LBS | SYSTOLIC BLOOD PRESSURE: 110 MMHG | HEIGHT: 58 IN

## 2018-10-08 DIAGNOSIS — R52 PAIN: ICD-10-CM

## 2018-10-08 DIAGNOSIS — R11.0 NAUSEA: ICD-10-CM

## 2018-10-08 DIAGNOSIS — K31.84 GASTROPARESIS: Primary | ICD-10-CM

## 2018-10-08 PROCEDURE — 63600175 PHARM REV CODE 636 W HCPCS: Performed by: INTERNAL MEDICINE

## 2018-10-08 PROCEDURE — 96361 HYDRATE IV INFUSION ADD-ON: CPT

## 2018-10-08 PROCEDURE — 25000003 PHARM REV CODE 250: Performed by: INTERNAL MEDICINE

## 2018-10-08 PROCEDURE — 96365 THER/PROPH/DIAG IV INF INIT: CPT

## 2018-10-08 RX ORDER — ONDANSETRON 2 MG/ML
8 INJECTION INTRAMUSCULAR; INTRAVENOUS EVERY 4 HOURS PRN
Status: CANCELLED
Start: 2018-10-08

## 2018-10-08 RX ORDER — SODIUM CHLORIDE 0.9 % (FLUSH) 0.9 %
10 SYRINGE (ML) INJECTION
Status: CANCELLED | OUTPATIENT
Start: 2018-10-08

## 2018-10-08 RX ORDER — ONDANSETRON 2 MG/ML
8 INJECTION INTRAMUSCULAR; INTRAVENOUS EVERY 4 HOURS PRN
Status: DISCONTINUED | OUTPATIENT
Start: 2018-10-08 | End: 2018-10-08 | Stop reason: HOSPADM

## 2018-10-08 RX ORDER — HEPARIN SODIUM (PORCINE) LOCK FLUSH IV SOLN 100 UNIT/ML 100 UNIT/ML
100 SOLUTION INTRAVENOUS
Status: CANCELLED | OUTPATIENT
Start: 2018-10-08

## 2018-10-08 RX ADMIN — PROMETHAZINE HYDROCHLORIDE 12.5 MG: 25 INJECTION INTRAMUSCULAR; INTRAVENOUS at 09:10

## 2018-10-08 RX ADMIN — SODIUM CHLORIDE 1500 ML: 0.9 INJECTION, SOLUTION INTRAVENOUS at 09:10

## 2018-10-08 NOTE — PLAN OF CARE
Problem: Patient Care Overview (Adult)  Goal: Plan of Care Review  Outcome: Ongoing (interventions implemented as appropriate)  Pt tolerated IVF and IV phenergan without issue. VSS. AVS given.

## 2018-10-12 ENCOUNTER — INFUSION (OUTPATIENT)
Dept: INFUSION THERAPY | Facility: OTHER | Age: 38
End: 2018-10-12
Attending: INTERNAL MEDICINE
Payer: COMMERCIAL

## 2018-10-12 VITALS
RESPIRATION RATE: 17 BRPM | OXYGEN SATURATION: 99 % | SYSTOLIC BLOOD PRESSURE: 109 MMHG | BODY MASS INDEX: 26.88 KG/M2 | DIASTOLIC BLOOD PRESSURE: 77 MMHG | HEART RATE: 99 BPM | TEMPERATURE: 99 F | HEIGHT: 58 IN | WEIGHT: 128.06 LBS

## 2018-10-12 DIAGNOSIS — K31.84 GASTROPARESIS: ICD-10-CM

## 2018-10-12 DIAGNOSIS — R52 PAIN: Primary | ICD-10-CM

## 2018-10-12 DIAGNOSIS — R11.0 NAUSEA: ICD-10-CM

## 2018-10-12 PROCEDURE — 25000003 PHARM REV CODE 250: Performed by: INTERNAL MEDICINE

## 2018-10-12 PROCEDURE — 63600175 PHARM REV CODE 636 W HCPCS: Performed by: INTERNAL MEDICINE

## 2018-10-12 PROCEDURE — 96361 HYDRATE IV INFUSION ADD-ON: CPT

## 2018-10-12 PROCEDURE — 96365 THER/PROPH/DIAG IV INF INIT: CPT

## 2018-10-12 PROCEDURE — 96375 TX/PRO/DX INJ NEW DRUG ADDON: CPT

## 2018-10-12 RX ORDER — ONDANSETRON 2 MG/ML
8 INJECTION INTRAMUSCULAR; INTRAVENOUS EVERY 4 HOURS PRN
Status: DISCONTINUED | OUTPATIENT
Start: 2018-10-12 | End: 2018-10-12 | Stop reason: HOSPADM

## 2018-10-12 RX ORDER — ONDANSETRON 2 MG/ML
8 INJECTION INTRAMUSCULAR; INTRAVENOUS EVERY 4 HOURS PRN
Status: CANCELLED
Start: 2018-10-12

## 2018-10-12 RX ORDER — HEPARIN SODIUM (PORCINE) LOCK FLUSH IV SOLN 100 UNIT/ML 100 UNIT/ML
100 SOLUTION INTRAVENOUS
Status: CANCELLED | OUTPATIENT
Start: 2018-10-12

## 2018-10-12 RX ORDER — SODIUM CHLORIDE 0.9 % (FLUSH) 0.9 %
10 SYRINGE (ML) INJECTION
Status: CANCELLED | OUTPATIENT
Start: 2018-10-12

## 2018-10-12 RX ADMIN — ONDANSETRON 8 MG: 2 INJECTION INTRAMUSCULAR; INTRAVENOUS at 08:10

## 2018-10-12 RX ADMIN — PROMETHAZINE HYDROCHLORIDE 12.5 MG: 25 INJECTION INTRAMUSCULAR; INTRAVENOUS at 09:10

## 2018-10-12 RX ADMIN — SODIUM CHLORIDE 1500 ML: 0.9 INJECTION, SOLUTION INTRAVENOUS at 08:10

## 2018-10-12 NOTE — PLAN OF CARE
Problem: Patient Care Overview (Adult)  Goal: Plan of Care Review  Outcome: Ongoing (interventions implemented as appropriate)  Pt tolerated IVF, phenergan, and zofran without issue. VSS. AVS given.

## 2018-10-19 ENCOUNTER — INFUSION (OUTPATIENT)
Dept: INFUSION THERAPY | Facility: OTHER | Age: 38
End: 2018-10-19
Attending: INTERNAL MEDICINE
Payer: COMMERCIAL

## 2018-10-19 VITALS
TEMPERATURE: 97 F | DIASTOLIC BLOOD PRESSURE: 60 MMHG | RESPIRATION RATE: 17 BRPM | SYSTOLIC BLOOD PRESSURE: 105 MMHG | OXYGEN SATURATION: 95 % | HEART RATE: 95 BPM

## 2018-10-19 DIAGNOSIS — K31.84 GASTROPARESIS: ICD-10-CM

## 2018-10-19 DIAGNOSIS — R52 PAIN: Primary | ICD-10-CM

## 2018-10-19 DIAGNOSIS — R11.0 NAUSEA: ICD-10-CM

## 2018-10-19 PROCEDURE — 25000003 PHARM REV CODE 250: Performed by: INTERNAL MEDICINE

## 2018-10-19 PROCEDURE — 63600175 PHARM REV CODE 636 W HCPCS: Performed by: INTERNAL MEDICINE

## 2018-10-19 PROCEDURE — 96365 THER/PROPH/DIAG IV INF INIT: CPT

## 2018-10-19 PROCEDURE — 96361 HYDRATE IV INFUSION ADD-ON: CPT

## 2018-10-19 RX ORDER — SODIUM CHLORIDE 0.9 % (FLUSH) 0.9 %
10 SYRINGE (ML) INJECTION
Status: CANCELLED | OUTPATIENT
Start: 2018-10-19

## 2018-10-19 RX ORDER — ONDANSETRON 2 MG/ML
8 INJECTION INTRAMUSCULAR; INTRAVENOUS EVERY 4 HOURS PRN
Status: CANCELLED
Start: 2018-10-19

## 2018-10-19 RX ORDER — HEPARIN SODIUM (PORCINE) LOCK FLUSH IV SOLN 100 UNIT/ML 100 UNIT/ML
100 SOLUTION INTRAVENOUS
Status: CANCELLED | OUTPATIENT
Start: 2018-10-19

## 2018-10-19 RX ORDER — ONDANSETRON 2 MG/ML
8 INJECTION INTRAMUSCULAR; INTRAVENOUS EVERY 4 HOURS PRN
Status: DISCONTINUED | OUTPATIENT
Start: 2018-10-19 | End: 2018-10-19 | Stop reason: HOSPADM

## 2018-10-19 RX ADMIN — PROMETHAZINE HYDROCHLORIDE 12.5 MG: 25 INJECTION, SOLUTION INTRAMUSCULAR; INTRAVENOUS at 09:10

## 2018-10-19 RX ADMIN — SODIUM CHLORIDE 1500 ML: 0.9 INJECTION, SOLUTION INTRAVENOUS at 09:10

## 2018-10-19 NOTE — PLAN OF CARE
Problem: Patient Care Overview (Adult)  Goal: Plan of Care Review  Outcome: Ongoing (interventions implemented as appropriate)  Pt tolerated IVF and phenergan without issue. VSS. AVS given.

## 2018-10-24 ENCOUNTER — INFUSION (OUTPATIENT)
Dept: INFUSION THERAPY | Facility: OTHER | Age: 38
End: 2018-10-24
Attending: INTERNAL MEDICINE
Payer: COMMERCIAL

## 2018-10-24 VITALS
OXYGEN SATURATION: 98 % | RESPIRATION RATE: 17 BRPM | SYSTOLIC BLOOD PRESSURE: 115 MMHG | DIASTOLIC BLOOD PRESSURE: 65 MMHG | HEART RATE: 102 BPM | TEMPERATURE: 98 F

## 2018-10-24 DIAGNOSIS — K31.84 GASTROPARESIS: ICD-10-CM

## 2018-10-24 DIAGNOSIS — R52 PAIN: Primary | ICD-10-CM

## 2018-10-24 DIAGNOSIS — R11.0 NAUSEA: ICD-10-CM

## 2018-10-24 PROCEDURE — 63600175 PHARM REV CODE 636 W HCPCS: Performed by: INTERNAL MEDICINE

## 2018-10-24 PROCEDURE — 25000003 PHARM REV CODE 250: Performed by: INTERNAL MEDICINE

## 2018-10-24 PROCEDURE — 96365 THER/PROPH/DIAG IV INF INIT: CPT

## 2018-10-24 PROCEDURE — 96375 TX/PRO/DX INJ NEW DRUG ADDON: CPT

## 2018-10-24 PROCEDURE — 96361 HYDRATE IV INFUSION ADD-ON: CPT

## 2018-10-24 RX ORDER — ONDANSETRON 2 MG/ML
8 INJECTION INTRAMUSCULAR; INTRAVENOUS EVERY 4 HOURS PRN
Status: CANCELLED
Start: 2018-10-24

## 2018-10-24 RX ORDER — ONDANSETRON 2 MG/ML
8 INJECTION INTRAMUSCULAR; INTRAVENOUS EVERY 4 HOURS PRN
Status: DISCONTINUED | OUTPATIENT
Start: 2018-10-24 | End: 2018-10-24 | Stop reason: HOSPADM

## 2018-10-24 RX ORDER — HEPARIN SODIUM (PORCINE) LOCK FLUSH IV SOLN 100 UNIT/ML 100 UNIT/ML
100 SOLUTION INTRAVENOUS
Status: CANCELLED | OUTPATIENT
Start: 2018-10-24

## 2018-10-24 RX ORDER — SODIUM CHLORIDE 0.9 % (FLUSH) 0.9 %
10 SYRINGE (ML) INJECTION
Status: CANCELLED | OUTPATIENT
Start: 2018-10-24

## 2018-10-24 RX ADMIN — PROMETHAZINE HYDROCHLORIDE 12.5 MG: 25 INJECTION INTRAMUSCULAR; INTRAVENOUS at 09:10

## 2018-10-24 RX ADMIN — SODIUM CHLORIDE 1500 ML: 0.9 INJECTION, SOLUTION INTRAVENOUS at 08:10

## 2018-10-24 RX ADMIN — ONDANSETRON HYDROCHLORIDE 8 MG: 2 INJECTION INTRAMUSCULAR; INTRAVENOUS at 08:10

## 2018-10-24 NOTE — PLAN OF CARE
Problem: Patient Care Overview (Adult)  Goal: Plan of Care Review  Outcome: Ongoing (interventions implemented as appropriate)  Pt tolerated IVF and zofran/phenergan without issue. VSS. AVS given.

## 2018-10-30 ENCOUNTER — INFUSION (OUTPATIENT)
Dept: INFUSION THERAPY | Facility: OTHER | Age: 38
End: 2018-10-30
Attending: INTERNAL MEDICINE
Payer: COMMERCIAL

## 2018-10-30 VITALS
RESPIRATION RATE: 17 BRPM | HEART RATE: 101 BPM | TEMPERATURE: 98 F | OXYGEN SATURATION: 98 % | DIASTOLIC BLOOD PRESSURE: 64 MMHG | SYSTOLIC BLOOD PRESSURE: 105 MMHG

## 2018-10-30 DIAGNOSIS — R11.0 NAUSEA: ICD-10-CM

## 2018-10-30 DIAGNOSIS — R52 PAIN: Primary | ICD-10-CM

## 2018-10-30 DIAGNOSIS — K31.84 GASTROPARESIS: ICD-10-CM

## 2018-10-30 PROCEDURE — 96361 HYDRATE IV INFUSION ADD-ON: CPT

## 2018-10-30 PROCEDURE — 25000003 PHARM REV CODE 250: Performed by: INTERNAL MEDICINE

## 2018-10-30 PROCEDURE — 63600175 PHARM REV CODE 636 W HCPCS: Performed by: INTERNAL MEDICINE

## 2018-10-30 PROCEDURE — 96365 THER/PROPH/DIAG IV INF INIT: CPT

## 2018-10-30 RX ORDER — ONDANSETRON 2 MG/ML
8 INJECTION INTRAMUSCULAR; INTRAVENOUS EVERY 4 HOURS PRN
Status: DISCONTINUED | OUTPATIENT
Start: 2018-10-30 | End: 2018-10-30 | Stop reason: HOSPADM

## 2018-10-30 RX ORDER — SODIUM CHLORIDE 0.9 % (FLUSH) 0.9 %
10 SYRINGE (ML) INJECTION
Status: CANCELLED | OUTPATIENT
Start: 2018-10-30

## 2018-10-30 RX ORDER — ONDANSETRON 2 MG/ML
8 INJECTION INTRAMUSCULAR; INTRAVENOUS EVERY 4 HOURS PRN
Status: CANCELLED
Start: 2018-10-30

## 2018-10-30 RX ORDER — HEPARIN SODIUM (PORCINE) LOCK FLUSH IV SOLN 100 UNIT/ML 100 UNIT/ML
100 SOLUTION INTRAVENOUS
Status: CANCELLED | OUTPATIENT
Start: 2018-10-30

## 2018-10-30 RX ADMIN — SODIUM CHLORIDE 1500 ML: 0.9 INJECTION, SOLUTION INTRAVENOUS at 09:10

## 2018-10-30 RX ADMIN — PROMETHAZINE HYDROCHLORIDE 12.5 MG: 25 INJECTION, SOLUTION INTRAMUSCULAR; INTRAVENOUS at 09:10

## 2019-01-29 RX ORDER — ONDANSETRON 2 MG/ML
8 INJECTION INTRAMUSCULAR; INTRAVENOUS EVERY 4 HOURS PRN
Status: CANCELLED
Start: 2019-01-29

## 2019-01-29 RX ORDER — SODIUM CHLORIDE 0.9 % (FLUSH) 0.9 %
10 SYRINGE (ML) INJECTION
Status: CANCELLED | OUTPATIENT
Start: 2019-01-29

## 2019-01-29 RX ORDER — HEPARIN SODIUM (PORCINE) LOCK FLUSH IV SOLN 100 UNIT/ML 100 UNIT/ML
100 SOLUTION INTRAVENOUS
Status: CANCELLED | OUTPATIENT
Start: 2019-01-29

## 2019-01-30 ENCOUNTER — INFUSION (OUTPATIENT)
Dept: INFUSION THERAPY | Facility: OTHER | Age: 39
End: 2019-01-30
Attending: INTERNAL MEDICINE
Payer: COMMERCIAL

## 2019-01-30 VITALS
BODY MASS INDEX: 26.13 KG/M2 | DIASTOLIC BLOOD PRESSURE: 79 MMHG | TEMPERATURE: 99 F | RESPIRATION RATE: 18 BRPM | WEIGHT: 125 LBS | HEART RATE: 108 BPM | OXYGEN SATURATION: 97 % | SYSTOLIC BLOOD PRESSURE: 129 MMHG

## 2019-01-30 DIAGNOSIS — R52 PAIN: Primary | ICD-10-CM

## 2019-01-30 DIAGNOSIS — K31.84 GASTROPARESIS: ICD-10-CM

## 2019-01-30 DIAGNOSIS — R11.0 NAUSEA: ICD-10-CM

## 2019-01-30 PROCEDURE — 63600175 PHARM REV CODE 636 W HCPCS: Performed by: INTERNAL MEDICINE

## 2019-01-30 PROCEDURE — 96361 HYDRATE IV INFUSION ADD-ON: CPT

## 2019-01-30 PROCEDURE — 96365 THER/PROPH/DIAG IV INF INIT: CPT

## 2019-01-30 PROCEDURE — 25000003 PHARM REV CODE 250: Performed by: INTERNAL MEDICINE

## 2019-01-30 RX ORDER — ONDANSETRON 2 MG/ML
8 INJECTION INTRAMUSCULAR; INTRAVENOUS EVERY 4 HOURS PRN
Status: DISCONTINUED | OUTPATIENT
Start: 2019-01-30 | End: 2019-01-30 | Stop reason: HOSPADM

## 2019-01-30 RX ORDER — SODIUM CHLORIDE 0.9 % (FLUSH) 0.9 %
10 SYRINGE (ML) INJECTION
Status: CANCELLED | OUTPATIENT
Start: 2019-01-30

## 2019-01-30 RX ORDER — ONDANSETRON 2 MG/ML
8 INJECTION INTRAMUSCULAR; INTRAVENOUS EVERY 4 HOURS PRN
Status: CANCELLED
Start: 2019-01-30

## 2019-01-30 RX ORDER — HEPARIN SODIUM (PORCINE) LOCK FLUSH IV SOLN 100 UNIT/ML 100 UNIT/ML
100 SOLUTION INTRAVENOUS
Status: CANCELLED | OUTPATIENT
Start: 2019-01-30

## 2019-01-30 RX ADMIN — SODIUM CHLORIDE 1500 ML: 0.9 INJECTION, SOLUTION INTRAVENOUS at 08:01

## 2019-01-30 RX ADMIN — PROMETHAZINE HYDROCHLORIDE 12.5 MG: 25 INJECTION INTRAMUSCULAR; INTRAVENOUS at 08:01

## 2019-01-30 NOTE — PLAN OF CARE
Problem: Adult Inpatient Plan of Care  Goal: Plan of Care Review  Outcome: Ongoing (interventions implemented as appropriate)  IVF and phenergan administered, patient tolerated well. VSS, NAD. D/C'd home with self.

## 2019-02-04 ENCOUNTER — INFUSION (OUTPATIENT)
Dept: INFUSION THERAPY | Facility: OTHER | Age: 39
End: 2019-02-04
Attending: INTERNAL MEDICINE
Payer: COMMERCIAL

## 2019-02-04 VITALS
TEMPERATURE: 98 F | HEART RATE: 126 BPM | RESPIRATION RATE: 18 BRPM | SYSTOLIC BLOOD PRESSURE: 148 MMHG | OXYGEN SATURATION: 97 % | DIASTOLIC BLOOD PRESSURE: 80 MMHG

## 2019-02-04 DIAGNOSIS — R52 PAIN: Primary | ICD-10-CM

## 2019-02-04 DIAGNOSIS — K31.84 GASTROPARESIS: ICD-10-CM

## 2019-02-04 DIAGNOSIS — R11.0 NAUSEA: ICD-10-CM

## 2019-02-04 PROCEDURE — 96361 HYDRATE IV INFUSION ADD-ON: CPT

## 2019-02-04 PROCEDURE — 25000003 PHARM REV CODE 250: Performed by: INTERNAL MEDICINE

## 2019-02-04 PROCEDURE — 96375 TX/PRO/DX INJ NEW DRUG ADDON: CPT

## 2019-02-04 PROCEDURE — 63600175 PHARM REV CODE 636 W HCPCS: Performed by: INTERNAL MEDICINE

## 2019-02-04 PROCEDURE — 96365 THER/PROPH/DIAG IV INF INIT: CPT

## 2019-02-04 RX ORDER — HEPARIN SODIUM (PORCINE) LOCK FLUSH IV SOLN 100 UNIT/ML 100 UNIT/ML
100 SOLUTION INTRAVENOUS
Status: CANCELLED | OUTPATIENT
Start: 2019-02-04

## 2019-02-04 RX ORDER — ONDANSETRON 2 MG/ML
8 INJECTION INTRAMUSCULAR; INTRAVENOUS EVERY 4 HOURS PRN
Status: CANCELLED
Start: 2019-02-04

## 2019-02-04 RX ORDER — SODIUM CHLORIDE 0.9 % (FLUSH) 0.9 %
10 SYRINGE (ML) INJECTION
Status: CANCELLED | OUTPATIENT
Start: 2019-02-04

## 2019-02-04 RX ORDER — ONDANSETRON 2 MG/ML
8 INJECTION INTRAMUSCULAR; INTRAVENOUS EVERY 4 HOURS PRN
Status: DISCONTINUED | OUTPATIENT
Start: 2019-02-04 | End: 2019-02-04 | Stop reason: HOSPADM

## 2019-02-04 RX ADMIN — PROMETHAZINE HYDROCHLORIDE 12.5 MG: 25 INJECTION, SOLUTION INTRAMUSCULAR; INTRAVENOUS at 08:02

## 2019-02-04 RX ADMIN — SODIUM CHLORIDE 1500 ML: 0.9 INJECTION, SOLUTION INTRAVENOUS at 08:02

## 2019-02-04 RX ADMIN — SODIUM CHLORIDE 500 ML: 0.9 INJECTION, SOLUTION INTRAVENOUS at 01:02

## 2019-02-04 RX ADMIN — ONDANSETRON HYDROCHLORIDE 8 MG: 2 INJECTION INTRAMUSCULAR; INTRAVENOUS at 08:02

## 2019-02-04 NOTE — PLAN OF CARE
Problem: Adult Inpatient Plan of Care  Goal: Plan of Care Review  Outcome: Ongoing (interventions implemented as appropriate)  IVF and IV phenergan administered, patient tolerated well. VSS, NAD. D/C'd home with self.

## 2019-02-08 ENCOUNTER — INFUSION (OUTPATIENT)
Dept: INFUSION THERAPY | Facility: OTHER | Age: 39
End: 2019-02-08
Attending: INTERNAL MEDICINE
Payer: COMMERCIAL

## 2019-02-08 VITALS
SYSTOLIC BLOOD PRESSURE: 135 MMHG | OXYGEN SATURATION: 100 % | TEMPERATURE: 98 F | RESPIRATION RATE: 18 BRPM | DIASTOLIC BLOOD PRESSURE: 72 MMHG | HEART RATE: 101 BPM

## 2019-02-08 DIAGNOSIS — R52 PAIN: Primary | ICD-10-CM

## 2019-02-08 DIAGNOSIS — R11.0 NAUSEA: ICD-10-CM

## 2019-02-08 DIAGNOSIS — K31.84 GASTROPARESIS: ICD-10-CM

## 2019-02-08 PROCEDURE — 25000003 PHARM REV CODE 250: Performed by: INTERNAL MEDICINE

## 2019-02-08 PROCEDURE — 96365 THER/PROPH/DIAG IV INF INIT: CPT

## 2019-02-08 PROCEDURE — 63600175 PHARM REV CODE 636 W HCPCS: Performed by: INTERNAL MEDICINE

## 2019-02-08 PROCEDURE — 96361 HYDRATE IV INFUSION ADD-ON: CPT

## 2019-02-08 RX ORDER — SODIUM CHLORIDE 0.9 % (FLUSH) 0.9 %
10 SYRINGE (ML) INJECTION
Status: CANCELLED | OUTPATIENT
Start: 2019-02-08

## 2019-02-08 RX ORDER — ONDANSETRON 2 MG/ML
8 INJECTION INTRAMUSCULAR; INTRAVENOUS EVERY 4 HOURS PRN
Status: DISCONTINUED | OUTPATIENT
Start: 2019-02-08 | End: 2019-02-08 | Stop reason: HOSPADM

## 2019-02-08 RX ORDER — HEPARIN SODIUM (PORCINE) LOCK FLUSH IV SOLN 100 UNIT/ML 100 UNIT/ML
100 SOLUTION INTRAVENOUS
Status: CANCELLED | OUTPATIENT
Start: 2019-02-08

## 2019-02-08 RX ORDER — ONDANSETRON 2 MG/ML
8 INJECTION INTRAMUSCULAR; INTRAVENOUS EVERY 4 HOURS PRN
Status: CANCELLED
Start: 2019-02-08

## 2019-02-08 RX ADMIN — PROMETHAZINE HYDROCHLORIDE 12.5 MG: 25 INJECTION, SOLUTION INTRAMUSCULAR; INTRAVENOUS at 08:02

## 2019-02-08 RX ADMIN — SODIUM CHLORIDE 1500 ML: 0.9 INJECTION, SOLUTION INTRAVENOUS at 08:02

## 2019-02-08 NOTE — PLAN OF CARE
Problem: Adult Inpatient Plan of Care  Goal: Plan of Care Review  Outcome: Ongoing (interventions implemented as appropriate)  Pt tolerated IVF and IV phenergan without issue. VSS. AVS given, pt d/c home.

## 2019-02-12 ENCOUNTER — INFUSION (OUTPATIENT)
Dept: INFUSION THERAPY | Facility: OTHER | Age: 39
End: 2019-02-12
Attending: INTERNAL MEDICINE
Payer: COMMERCIAL

## 2019-02-12 VITALS
SYSTOLIC BLOOD PRESSURE: 116 MMHG | HEIGHT: 58 IN | RESPIRATION RATE: 18 BRPM | OXYGEN SATURATION: 98 % | HEART RATE: 99 BPM | WEIGHT: 124.13 LBS | BODY MASS INDEX: 26.05 KG/M2 | DIASTOLIC BLOOD PRESSURE: 59 MMHG | TEMPERATURE: 98 F

## 2019-02-12 DIAGNOSIS — K31.84 GASTROPARESIS: ICD-10-CM

## 2019-02-12 DIAGNOSIS — R11.0 NAUSEA: ICD-10-CM

## 2019-02-12 DIAGNOSIS — R52 PAIN: Primary | ICD-10-CM

## 2019-02-12 PROCEDURE — 96361 HYDRATE IV INFUSION ADD-ON: CPT

## 2019-02-12 PROCEDURE — 25000003 PHARM REV CODE 250: Performed by: INTERNAL MEDICINE

## 2019-02-12 PROCEDURE — 96365 THER/PROPH/DIAG IV INF INIT: CPT

## 2019-02-12 PROCEDURE — 63600175 PHARM REV CODE 636 W HCPCS: Performed by: INTERNAL MEDICINE

## 2019-02-12 RX ORDER — HEPARIN SODIUM (PORCINE) LOCK FLUSH IV SOLN 100 UNIT/ML 100 UNIT/ML
100 SOLUTION INTRAVENOUS
Status: CANCELLED | OUTPATIENT
Start: 2019-02-12

## 2019-02-12 RX ORDER — ONDANSETRON 2 MG/ML
8 INJECTION INTRAMUSCULAR; INTRAVENOUS EVERY 4 HOURS PRN
Status: CANCELLED
Start: 2019-02-12

## 2019-02-12 RX ORDER — SODIUM CHLORIDE 0.9 % (FLUSH) 0.9 %
10 SYRINGE (ML) INJECTION
Status: CANCELLED | OUTPATIENT
Start: 2019-02-12

## 2019-02-12 RX ORDER — ONDANSETRON 2 MG/ML
8 INJECTION INTRAMUSCULAR; INTRAVENOUS EVERY 4 HOURS PRN
Status: DISCONTINUED | OUTPATIENT
Start: 2019-02-12 | End: 2019-02-12 | Stop reason: HOSPADM

## 2019-02-12 RX ADMIN — SODIUM CHLORIDE 1500 ML: 0.9 INJECTION, SOLUTION INTRAVENOUS at 09:02

## 2019-02-12 RX ADMIN — PROMETHAZINE HYDROCHLORIDE 12.5 MG: 25 INJECTION INTRAMUSCULAR; INTRAVENOUS at 09:02

## 2019-02-12 NOTE — PLAN OF CARE
Problem: Adult Inpatient Plan of Care  Goal: Plan of Care Review  Outcome: Ongoing (interventions implemented as appropriate)  Pt tolerated IV phenergan and IVF without issue. VSS. AVS given. Pt d/c home.

## 2019-02-15 ENCOUNTER — INFUSION (OUTPATIENT)
Dept: INFUSION THERAPY | Facility: OTHER | Age: 39
End: 2019-02-15
Attending: INTERNAL MEDICINE
Payer: COMMERCIAL

## 2019-02-15 VITALS
RESPIRATION RATE: 17 BRPM | HEART RATE: 106 BPM | TEMPERATURE: 98 F | DIASTOLIC BLOOD PRESSURE: 70 MMHG | SYSTOLIC BLOOD PRESSURE: 109 MMHG | OXYGEN SATURATION: 98 %

## 2019-02-15 DIAGNOSIS — K31.84 GASTROPARESIS: ICD-10-CM

## 2019-02-15 DIAGNOSIS — R11.0 NAUSEA: ICD-10-CM

## 2019-02-15 DIAGNOSIS — R52 PAIN: Primary | ICD-10-CM

## 2019-02-15 PROCEDURE — 96365 THER/PROPH/DIAG IV INF INIT: CPT

## 2019-02-15 PROCEDURE — 25000003 PHARM REV CODE 250: Performed by: INTERNAL MEDICINE

## 2019-02-15 PROCEDURE — 96361 HYDRATE IV INFUSION ADD-ON: CPT

## 2019-02-15 PROCEDURE — 63600175 PHARM REV CODE 636 W HCPCS: Performed by: INTERNAL MEDICINE

## 2019-02-15 RX ORDER — HEPARIN SODIUM (PORCINE) LOCK FLUSH IV SOLN 100 UNIT/ML 100 UNIT/ML
100 SOLUTION INTRAVENOUS
Status: CANCELLED | OUTPATIENT
Start: 2019-02-15

## 2019-02-15 RX ORDER — ONDANSETRON 2 MG/ML
8 INJECTION INTRAMUSCULAR; INTRAVENOUS EVERY 4 HOURS PRN
Status: DISCONTINUED | OUTPATIENT
Start: 2019-02-15 | End: 2019-02-15 | Stop reason: HOSPADM

## 2019-02-15 RX ORDER — SODIUM CHLORIDE 0.9 % (FLUSH) 0.9 %
10 SYRINGE (ML) INJECTION
Status: CANCELLED | OUTPATIENT
Start: 2019-02-15

## 2019-02-15 RX ORDER — ONDANSETRON 2 MG/ML
8 INJECTION INTRAMUSCULAR; INTRAVENOUS EVERY 4 HOURS PRN
Status: CANCELLED
Start: 2019-02-15

## 2019-02-15 RX ADMIN — SODIUM CHLORIDE 1500 ML: 0.9 INJECTION, SOLUTION INTRAVENOUS at 09:02

## 2019-02-15 RX ADMIN — PROMETHAZINE HYDROCHLORIDE 12.5 MG: 25 INJECTION INTRAMUSCULAR; INTRAVENOUS at 09:02

## 2019-02-15 NOTE — PLAN OF CARE
Problem: Adult Inpatient Plan of Care  Goal: Plan of Care Review  Outcome: Ongoing (interventions implemented as appropriate)  Pt tolerated Phenergan and IVF without issue. VSS. AVS given.

## 2019-02-20 ENCOUNTER — INFUSION (OUTPATIENT)
Dept: INFUSION THERAPY | Facility: OTHER | Age: 39
End: 2019-02-20
Attending: INTERNAL MEDICINE
Payer: COMMERCIAL

## 2019-02-20 VITALS
DIASTOLIC BLOOD PRESSURE: 67 MMHG | OXYGEN SATURATION: 97 % | SYSTOLIC BLOOD PRESSURE: 107 MMHG | RESPIRATION RATE: 17 BRPM | TEMPERATURE: 98 F | HEART RATE: 97 BPM

## 2019-02-20 DIAGNOSIS — K31.84 GASTROPARESIS: ICD-10-CM

## 2019-02-20 DIAGNOSIS — R11.0 NAUSEA: ICD-10-CM

## 2019-02-20 DIAGNOSIS — R52 PAIN: Primary | ICD-10-CM

## 2019-02-20 PROCEDURE — 63600175 PHARM REV CODE 636 W HCPCS: Performed by: INTERNAL MEDICINE

## 2019-02-20 PROCEDURE — 96361 HYDRATE IV INFUSION ADD-ON: CPT

## 2019-02-20 PROCEDURE — 25000003 PHARM REV CODE 250: Performed by: INTERNAL MEDICINE

## 2019-02-20 PROCEDURE — 96365 THER/PROPH/DIAG IV INF INIT: CPT

## 2019-02-20 RX ORDER — ONDANSETRON 2 MG/ML
8 INJECTION INTRAMUSCULAR; INTRAVENOUS EVERY 4 HOURS PRN
Status: CANCELLED
Start: 2019-02-20

## 2019-02-20 RX ORDER — HEPARIN SODIUM (PORCINE) LOCK FLUSH IV SOLN 100 UNIT/ML 100 UNIT/ML
100 SOLUTION INTRAVENOUS
Status: CANCELLED | OUTPATIENT
Start: 2019-02-20

## 2019-02-20 RX ORDER — ONDANSETRON 2 MG/ML
8 INJECTION INTRAMUSCULAR; INTRAVENOUS EVERY 4 HOURS PRN
Status: DISCONTINUED | OUTPATIENT
Start: 2019-02-20 | End: 2019-02-20 | Stop reason: HOSPADM

## 2019-02-20 RX ORDER — SODIUM CHLORIDE 0.9 % (FLUSH) 0.9 %
10 SYRINGE (ML) INJECTION
Status: CANCELLED | OUTPATIENT
Start: 2019-02-20

## 2019-02-20 RX ADMIN — PROMETHAZINE HYDROCHLORIDE 12.5 MG: 25 INJECTION INTRAMUSCULAR; INTRAVENOUS at 08:02

## 2019-02-20 RX ADMIN — SODIUM CHLORIDE 1500 ML: 0.9 INJECTION, SOLUTION INTRAVENOUS at 08:02

## 2019-02-28 ENCOUNTER — INFUSION (OUTPATIENT)
Dept: INFUSION THERAPY | Facility: OTHER | Age: 39
End: 2019-02-28
Attending: INTERNAL MEDICINE
Payer: COMMERCIAL

## 2019-02-28 VITALS
DIASTOLIC BLOOD PRESSURE: 68 MMHG | SYSTOLIC BLOOD PRESSURE: 105 MMHG | OXYGEN SATURATION: 99 % | RESPIRATION RATE: 18 BRPM | TEMPERATURE: 98 F | HEART RATE: 93 BPM

## 2019-02-28 DIAGNOSIS — R52 PAIN: Primary | ICD-10-CM

## 2019-02-28 DIAGNOSIS — K31.84 GASTROPARESIS: ICD-10-CM

## 2019-02-28 DIAGNOSIS — R11.0 NAUSEA: ICD-10-CM

## 2019-02-28 PROCEDURE — 96365 THER/PROPH/DIAG IV INF INIT: CPT

## 2019-02-28 PROCEDURE — 63600175 PHARM REV CODE 636 W HCPCS: Performed by: INTERNAL MEDICINE

## 2019-02-28 PROCEDURE — 25000003 PHARM REV CODE 250: Performed by: INTERNAL MEDICINE

## 2019-02-28 PROCEDURE — 96361 HYDRATE IV INFUSION ADD-ON: CPT

## 2019-02-28 RX ORDER — ONDANSETRON 2 MG/ML
8 INJECTION INTRAMUSCULAR; INTRAVENOUS EVERY 4 HOURS PRN
Status: DISCONTINUED | OUTPATIENT
Start: 2019-02-28 | End: 2019-02-28 | Stop reason: HOSPADM

## 2019-02-28 RX ORDER — ONDANSETRON 2 MG/ML
8 INJECTION INTRAMUSCULAR; INTRAVENOUS EVERY 4 HOURS PRN
Status: CANCELLED
Start: 2019-02-28

## 2019-02-28 RX ORDER — HEPARIN SODIUM (PORCINE) LOCK FLUSH IV SOLN 100 UNIT/ML 100 UNIT/ML
100 SOLUTION INTRAVENOUS
Status: CANCELLED | OUTPATIENT
Start: 2019-02-28

## 2019-02-28 RX ORDER — SODIUM CHLORIDE 0.9 % (FLUSH) 0.9 %
10 SYRINGE (ML) INJECTION
Status: CANCELLED | OUTPATIENT
Start: 2019-02-28

## 2019-02-28 RX ADMIN — SODIUM CHLORIDE 1500 ML: 0.9 INJECTION, SOLUTION INTRAVENOUS at 08:02

## 2019-02-28 RX ADMIN — PROMETHAZINE HYDROCHLORIDE 12.5 MG: 25 INJECTION INTRAMUSCULAR; INTRAVENOUS at 09:02

## 2019-02-28 NOTE — PLAN OF CARE
Problem: Adult Inpatient Plan of Care  Goal: Plan of Care Review  Outcome: Ongoing (interventions implemented as appropriate)  Pt tolerated phenergan and IVF without issue. VSS. AVS given. Pt d/c home.

## 2019-03-06 ENCOUNTER — INFUSION (OUTPATIENT)
Dept: INFUSION THERAPY | Facility: OTHER | Age: 39
End: 2019-03-06
Attending: INTERNAL MEDICINE
Payer: COMMERCIAL

## 2019-03-06 VITALS
TEMPERATURE: 98 F | RESPIRATION RATE: 16 BRPM | OXYGEN SATURATION: 98 % | SYSTOLIC BLOOD PRESSURE: 116 MMHG | HEART RATE: 98 BPM | DIASTOLIC BLOOD PRESSURE: 64 MMHG

## 2019-03-06 DIAGNOSIS — R52 PAIN: Primary | ICD-10-CM

## 2019-03-06 DIAGNOSIS — K31.84 GASTROPARESIS: ICD-10-CM

## 2019-03-06 DIAGNOSIS — R11.0 NAUSEA: ICD-10-CM

## 2019-03-06 PROCEDURE — 25000003 PHARM REV CODE 250: Performed by: INTERNAL MEDICINE

## 2019-03-06 PROCEDURE — 63600175 PHARM REV CODE 636 W HCPCS: Performed by: INTERNAL MEDICINE

## 2019-03-06 PROCEDURE — 96361 HYDRATE IV INFUSION ADD-ON: CPT

## 2019-03-06 PROCEDURE — 96365 THER/PROPH/DIAG IV INF INIT: CPT

## 2019-03-06 RX ORDER — HEPARIN SODIUM (PORCINE) LOCK FLUSH IV SOLN 100 UNIT/ML 100 UNIT/ML
100 SOLUTION INTRAVENOUS
Status: CANCELLED | OUTPATIENT
Start: 2019-03-06

## 2019-03-06 RX ORDER — ONDANSETRON 2 MG/ML
8 INJECTION INTRAMUSCULAR; INTRAVENOUS EVERY 4 HOURS PRN
Status: CANCELLED
Start: 2019-03-06

## 2019-03-06 RX ORDER — SODIUM CHLORIDE 0.9 % (FLUSH) 0.9 %
10 SYRINGE (ML) INJECTION
Status: CANCELLED | OUTPATIENT
Start: 2019-03-06

## 2019-03-06 RX ORDER — ONDANSETRON 2 MG/ML
8 INJECTION INTRAMUSCULAR; INTRAVENOUS EVERY 4 HOURS PRN
Status: DISCONTINUED | OUTPATIENT
Start: 2019-03-06 | End: 2019-03-06 | Stop reason: HOSPADM

## 2019-03-06 RX ADMIN — PROMETHAZINE HYDROCHLORIDE 12.5 MG: 25 INJECTION INTRAMUSCULAR; INTRAVENOUS at 08:03

## 2019-03-06 RX ADMIN — SODIUM CHLORIDE 1500 ML: 0.9 INJECTION, SOLUTION INTRAVENOUS at 08:03

## 2019-03-12 ENCOUNTER — OFFICE VISIT (OUTPATIENT)
Dept: SURGERY | Facility: CLINIC | Age: 39
End: 2019-03-12
Payer: COMMERCIAL

## 2019-03-12 VITALS — WEIGHT: 124 LBS | BODY MASS INDEX: 26.03 KG/M2 | HEIGHT: 58 IN

## 2019-03-12 DIAGNOSIS — K31.84 GASTROPARESIS: Primary | ICD-10-CM

## 2019-03-12 PROCEDURE — 99999 PR PBB SHADOW E&M-EST. PATIENT-LVL IV: ICD-10-PCS | Mod: PBBFAC,,, | Performed by: SURGERY

## 2019-03-12 PROCEDURE — 95982 IO GA N-STIM SUBSQ W/REPROG: CPT | Mod: S$GLB,,, | Performed by: SURGERY

## 2019-03-12 PROCEDURE — 99204 PR OFFICE/OUTPT VISIT, NEW, LEVL IV, 45-59 MIN: ICD-10-PCS | Mod: S$GLB,,, | Performed by: SURGERY

## 2019-03-12 PROCEDURE — 95982 PR ELEC ALYS NSTIM PLS GEN GASTRIC SUBSEQUENT W/REPROG: ICD-10-PCS | Mod: S$GLB,,, | Performed by: SURGERY

## 2019-03-12 PROCEDURE — 99204 OFFICE O/P NEW MOD 45 MIN: CPT | Mod: S$GLB,,, | Performed by: SURGERY

## 2019-03-12 PROCEDURE — 3008F BODY MASS INDEX DOCD: CPT | Mod: CPTII,S$GLB,, | Performed by: SURGERY

## 2019-03-12 PROCEDURE — 99999 PR PBB SHADOW E&M-EST. PATIENT-LVL IV: CPT | Mod: PBBFAC,,, | Performed by: SURGERY

## 2019-03-12 PROCEDURE — 3008F PR BODY MASS INDEX (BMI) DOCUMENTED: ICD-10-PCS | Mod: CPTII,S$GLB,, | Performed by: SURGERY

## 2019-03-12 RX ORDER — LAMOTRIGINE 100 MG/1
100 TABLET ORAL
COMMUNITY
End: 2019-06-13

## 2019-03-12 RX ORDER — ESOMEPRAZOLE MAGNESIUM 40 MG/1
40 CAPSULE, DELAYED RELEASE ORAL
COMMUNITY
End: 2019-06-13

## 2019-03-12 RX ORDER — DESVENLAFAXINE SUCCINATE 50 MG/1
TABLET, EXTENDED RELEASE ORAL
Refills: 11 | COMMUNITY
Start: 2019-01-21

## 2019-03-12 RX ORDER — DEXTROAMPHETAMINE SULFATE 10 MG/1
10 TABLET ORAL
COMMUNITY

## 2019-03-12 RX ORDER — CLOBETASOL PROPIONATE 0.46 MG/ML
SOLUTION TOPICAL
Refills: 1 | COMMUNITY
Start: 2019-02-18

## 2019-03-12 RX ORDER — DESVENLAFAXINE 100 MG/1
TABLET, EXTENDED RELEASE ORAL
COMMUNITY
Start: 2019-03-09 | End: 2019-06-13

## 2019-03-12 RX ORDER — PREDNISONE 20 MG/1
TABLET ORAL
COMMUNITY
Start: 2017-11-10 | End: 2019-06-13

## 2019-03-12 RX ORDER — CYCLOBENZAPRINE HCL 10 MG
10 TABLET ORAL 3 TIMES DAILY PRN
COMMUNITY
Start: 2017-11-10 | End: 2019-06-13

## 2019-03-12 NOTE — LETTER
[facility]  [address]  [facility] [address]  Phone: [phone] March 12, 2019      Liz Alvarado MD  [address]  [address]  [facility] [address]    Patient: Tiffanie Wharton   MR Number: [id number]   YOB: 1980   Date of Visit: 3/12/2019     Dear Dr. Alvarado:    Thank you for referring Tiffanie Wharton to me for evaluation. Attached you will find relevant portions of my assessment and plan of care.    Ms. Wharton presents with idiopathic gastroparesis and status post stimulator replacement 2016.  She has been doing generally well but has always had some flares of symptoms.  She has mild epigastric pain, severe to extremely severe nausea and mild to severe vomiting.  She takes domperidone 3-4 times a day, zofran occasionally but it does not help much and causes constipation, phenergan occasionally, compazine occasionally, and nexium once a day.     The current gastroparesis flare has lasted the longest.  In the last year she has not been to the ER but goes to an infusion center during a flare of symptoms.  She has never had MIRELA or a feeding tube.  It has been adjusted within a month.      Gastric Stimulator Interrogation: impedence 581 Voltage 9 Current 15.5 Pulse [id number] Rate 14 Cycle on 2 Cycle off 3, battery ok.      She is concerned because the battery will likely be out soon and she will have a gap in healthcare for six months moving to [place] and she would like to get it changed now.    If you have questions, please do not hesitate to call me. I look forward to following Tiffanie Wharton along with you.    Sincerely,      Johan Jeffers MD  Professor, [employer]  Section Head, General Surgery  [facility]    WSR/afw    CC  Js Huitron MD

## 2019-03-12 NOTE — PROGRESS NOTES
History & Physical  Surgery      SUBJECTIVE:     Chief Complaint/Reason for Admission: Gastroparesis flare    History of Present Illness: Tiffanie Wharton is a 38 y.o. female with hx ADHD, depression, crohn's, and gastroparesis (idiopathic, dx 2008) presents to clinic for visit due increased nausea and early satiety since Jan 30, 2019. She states she is currently in a gastroparesis flare. Patient reports eating not as well since October after stopping her daily marijuana. Symptoms have recently worsened since Jan 30 with increased nausea, and intermittent vomiting. Today she is able to eat 1-2 eggs a day, crackers, and gatorade. She is going to infusion 1-2 times a week. She states she only needs infusion when struggling to satiety hydrated PO and symptoms. She is getting  in March and moving to [place] in June-July.     Of note, she had a gastric stimulator placed at [facility]. Battery replaced 2012. Whole machinery replaced (Entera v2.0) in 2016 and lead now placed posteriorly (was anteriorly). Since 2016 this has been her longest, worse flare. Although she has had other shorter flares.    Mid January she switched from 20 mg lexapro to Prestiq. She is now back on Lexapro as she thinks this may have triggered her current flare.     For nausea patient currently takes:  Nexium 40 mg PO qday  Zofran 4 mg PO q8hr prn  Phenergan 25 mg PO q4hr prn    Per EMR has not had any recent hospitalizations for her symptoms or decreased PO intake.       Current Outpatient Medications on File Prior to Visit   Medication Sig    cyclobenzaprine (FLEXERIL) 10 MG tablet Take 10 mg by mouth 3 (three) times daily as needed.    predniSONE (DELTASONE) 20 MG tablet Take 3 tabs daily for 5 days..    busPIRone (BUSPAR) 10 MG tablet Take 10 mg by mouth 2 (two) times daily.    clobetasol (TEMOVATE) 0.05 % cream 1 application 2 (two) times daily. Apply to affected area    clobetasol (TEMOVATE) 0.05 % external solution APPLY TO SCALP ONCE  TO TWICE DAILY    clonazePAM (KLONOPIN) 0.5 MG tablet Take 0.5 mg by mouth as needed.     desvenlafaxine succinate (PRISTIQ) 100 MG Tb24     desvenlafaxine succinate (PRISTIQ) 50 MG Tb24 TAKE 1/2 TABLET BY MOUTH EVERY MORNING FOR 7 DAYS, THEN TAKE 1 TABLET EVERY MORNING    dextroamphetamine (DEXEDRINE SPANSULE) 10 MG 24 hr capsule Take 10 mg by mouth once daily.    dextroamphetamine (DEXEDRINE) 10 MG tablet Take 10 mg by mouth.    DOMPERIDONE, BULK, MISC by Misc.(Non-Drug; Combo Route) route.    EPIPEN 2-BISI 0.3 mg/0.3 mL AtIn INJECT 0.3 MLS (0.3 MG TOTAL) INTO THE MUSCLE ONCE.    escitalopram oxalate (LEXAPRO) 20 MG tablet     esomeprazole (NEXIUM) 40 MG capsule Take 40 mg by mouth before breakfast.    esomeprazole (NEXIUM) 40 MG capsule Take 40 mg by mouth.    lamotrigine (LAMICTAL) 100 MG tablet Take 100 mg by mouth once daily.    lamoTRIgine (LAMICTAL) 100 MG tablet Take 100 mg by mouth.    meloxicam (MOBIC) 15 MG tablet TAKE 1 TABLET (15 MG TOTAL) BY MOUTH DAILY AS NEEDED FOR PAIN.    ondansetron (ZOFRAN-ODT) 4 MG TbDL Take 1 tablet (4 mg total) by mouth every 8 (eight) hours as needed.    PREVIDENT 5000 BOOSTER PLUS 1.1 % Pste AS DIRECTED    prochlorperazine (COMPAZINE) 10 MG tablet     promethazine (PHENERGAN) 25 MG tablet Take 1 tablet (25 mg total) by mouth every 4 (four) hours as needed for Nausea.    tacrolimus (PROTOPIC) 0.1 % ointment 1 application 2 (two) times daily. Apply to skin    trazodone (DESYREL) 25 MG tablet Take by mouth every evening.     Current Facility-Administered Medications on File Prior to Visit   Medication    promethazine injection 12.5 mg       Review of patient's allergies indicates:   Allergen Reactions    Penicillins Rash       Past Medical History:   Diagnosis Date    ADHD (attention deficit hyperactivity disorder)     Crohn's disease     Gastroparesis      Past Surgical History:   Procedure Laterality Date    ABDOMINAL SURGERY       Family History    Problem Relation Age of Onset    Ovarian cancer Other     Ovarian cancer Other     Breast cancer Neg Hx      Social History     Tobacco Use    Smoking status: Former Smoker     Last attempt to quit: 1/1/2009     Years since quitting: 10.1    Smokeless tobacco: Never Used   Substance Use Topics    Alcohol use: No     Alcohol/week: 0.0 oz    Drug use: Yes     Types: Marijuana        Review of Systems   Constitutional: Positive for appetite change and unexpected weight change (13 lbs weight loss). Negative for chills, diaphoresis and fever.   HENT: Negative for congestion and sore throat.    Respiratory: Negative for shortness of breath and wheezing.    Cardiovascular: Negative for chest pain and palpitations.   Gastrointestinal: Positive for constipation, nausea and vomiting. Negative for abdominal distention and abdominal pain.   Genitourinary: Positive for frequency. Negative for difficulty urinating.   Musculoskeletal: Negative for arthralgias and myalgias.   Skin: Negative for pallor and wound.   Neurological: Positive for light-headedness (upon standing). Negative for syncope and headaches.   Psychiatric/Behavioral: Negative for agitation and confusion.     OBJECTIVE:     Vital Signs (Most Recent)       Physical Exam   Constitutional: She is oriented to person, place, and time. She appears well-developed. No distress.   HENT:   Head: Normocephalic and atraumatic.   Eyes: Conjunctivae and EOM are normal.   Neck: Normal range of motion. Neck supple.   Cardiovascular: Normal rate, regular rhythm and intact distal pulses.   Pulmonary/Chest: Effort normal and breath sounds normal. No respiratory distress.   Abdominal: Soft. She exhibits no distension. There is no tenderness.   Decreased bowel sounds in RUQ/LUQ   Musculoskeletal: Normal range of motion. She exhibits no edema.   Neurological: She is alert and oriented to person, place, and time.   Skin: Skin is warm and dry.   Psychiatric: She has a normal mood  and affect. Thought content normal.     Diagnostic Results:  Upper GI: Reviewed   EGD: 02/26/19 - hiatal hernia of cardia, polyp in stomach    ASSESSMENT/PLAN:     A/P: 37 yo F with significant history of gastroparesis presents to clinic amid a flare since 01/30/19. She has complaints of severe nausea and inability to eat enough to sustain herself.

## 2019-03-12 NOTE — PROGRESS NOTES
I have seen the patient, reviewed the Resident's history and physical, assessment and plan. I have personally interviewed and examined the patient at bedside and: agree with the findings.     She has idiopathic gastroparesis and s/p stimulator replacement 2016.  She has been doing generally well but has always had some flares of symptoms.  She has mild epigastric pain, severe to extremely severe nausea and mild to severe vomiting.  She takes domperidone 3-4 times a day, zofran occasionally but it doesn't help much and causes constipation, phenergan occasionally, compazine occasionally, and nexium once a day. The current gastroparesis flare has lasted the longest.  In the last year she has not been to the ER but goes to an infusion center during a flare of symptoms.  She has never had dayan or a feeding tube.  It has been adjusted within a month.  Gastric Stimulator Interrogation: impedence 581 Voltage 9 Current 15.5 Pulse [id number] Rate 14 Cycle on 2 Cycle off 3, battery ok.  She are concerned because the battery will likely be out soon and she will have a gap in healthcare for 6 months moving to [place] and she would like to get it changed now.

## 2019-03-15 ENCOUNTER — RX ONLY (OUTPATIENT)
Age: 39
Setting detail: RX ONLY
End: 2019-03-15

## 2019-03-15 RX ORDER — ERYTHROMYCIN 20 MG/G
GEL TOPICAL
Qty: 1 | Refills: 0 | Status: ERX | COMMUNITY
Start: 2019-03-15

## 2019-03-22 ENCOUNTER — INFUSION (OUTPATIENT)
Dept: INFUSION THERAPY | Facility: OTHER | Age: 39
End: 2019-03-22
Attending: INTERNAL MEDICINE
Payer: COMMERCIAL

## 2019-03-22 VITALS
DIASTOLIC BLOOD PRESSURE: 65 MMHG | OXYGEN SATURATION: 98 % | RESPIRATION RATE: 18 BRPM | SYSTOLIC BLOOD PRESSURE: 111 MMHG | TEMPERATURE: 98 F | HEART RATE: 89 BPM

## 2019-03-22 DIAGNOSIS — R11.0 NAUSEA: ICD-10-CM

## 2019-03-22 DIAGNOSIS — K31.84 GASTROPARESIS: ICD-10-CM

## 2019-03-22 DIAGNOSIS — R52 PAIN: Primary | ICD-10-CM

## 2019-03-22 PROCEDURE — 96361 HYDRATE IV INFUSION ADD-ON: CPT

## 2019-03-22 PROCEDURE — 25000003 PHARM REV CODE 250: Performed by: INTERNAL MEDICINE

## 2019-03-22 PROCEDURE — 63600175 PHARM REV CODE 636 W HCPCS: Performed by: INTERNAL MEDICINE

## 2019-03-22 PROCEDURE — 96374 THER/PROPH/DIAG INJ IV PUSH: CPT

## 2019-03-22 RX ORDER — ONDANSETRON 2 MG/ML
8 INJECTION INTRAMUSCULAR; INTRAVENOUS EVERY 4 HOURS PRN
Status: CANCELLED
Start: 2019-03-22

## 2019-03-22 RX ORDER — HEPARIN SODIUM (PORCINE) LOCK FLUSH IV SOLN 100 UNIT/ML 100 UNIT/ML
100 SOLUTION INTRAVENOUS
Status: CANCELLED | OUTPATIENT
Start: 2019-03-22

## 2019-03-22 RX ORDER — ONDANSETRON 2 MG/ML
8 INJECTION INTRAMUSCULAR; INTRAVENOUS EVERY 4 HOURS PRN
Status: DISCONTINUED | OUTPATIENT
Start: 2019-03-22 | End: 2019-03-22 | Stop reason: HOSPADM

## 2019-03-22 RX ORDER — SODIUM CHLORIDE 0.9 % (FLUSH) 0.9 %
10 SYRINGE (ML) INJECTION
Status: CANCELLED | OUTPATIENT
Start: 2019-03-22

## 2019-03-22 RX ADMIN — ONDANSETRON 8 MG: 2 INJECTION INTRAMUSCULAR; INTRAVENOUS at 08:03

## 2019-03-22 RX ADMIN — SODIUM CHLORIDE 1500 ML: 0.9 INJECTION, SOLUTION INTRAVENOUS at 08:03

## 2019-03-22 NOTE — PLAN OF CARE
Problem: Adult Inpatient Plan of Care  Goal: Plan of Care Review  Outcome: Ongoing (interventions implemented as appropriate)  Pt tolerated IVF and zofran without issue. VSS. AVS given. Pt d/c home.

## 2019-04-01 ENCOUNTER — INFUSION (OUTPATIENT)
Dept: INFUSION THERAPY | Facility: OTHER | Age: 39
End: 2019-04-01
Attending: INTERNAL MEDICINE
Payer: COMMERCIAL

## 2019-04-01 VITALS
OXYGEN SATURATION: 96 % | HEIGHT: 58 IN | BODY MASS INDEX: 24.24 KG/M2 | HEART RATE: 94 BPM | TEMPERATURE: 98 F | SYSTOLIC BLOOD PRESSURE: 101 MMHG | RESPIRATION RATE: 18 BRPM | DIASTOLIC BLOOD PRESSURE: 56 MMHG | WEIGHT: 115.5 LBS

## 2019-04-01 DIAGNOSIS — K31.84 GASTROPARESIS: ICD-10-CM

## 2019-04-01 DIAGNOSIS — R11.0 NAUSEA: ICD-10-CM

## 2019-04-01 DIAGNOSIS — R52 PAIN: Primary | ICD-10-CM

## 2019-04-01 PROCEDURE — 25000003 PHARM REV CODE 250: Performed by: INTERNAL MEDICINE

## 2019-04-01 PROCEDURE — 96365 THER/PROPH/DIAG IV INF INIT: CPT

## 2019-04-01 PROCEDURE — 96361 HYDRATE IV INFUSION ADD-ON: CPT

## 2019-04-01 PROCEDURE — 96360 HYDRATION IV INFUSION INIT: CPT

## 2019-04-01 PROCEDURE — 63600175 PHARM REV CODE 636 W HCPCS: Performed by: INTERNAL MEDICINE

## 2019-04-01 RX ORDER — ONDANSETRON 2 MG/ML
8 INJECTION INTRAMUSCULAR; INTRAVENOUS EVERY 4 HOURS PRN
Status: DISCONTINUED | OUTPATIENT
Start: 2019-04-01 | End: 2019-04-01 | Stop reason: HOSPADM

## 2019-04-01 RX ORDER — HEPARIN SODIUM (PORCINE) LOCK FLUSH IV SOLN 100 UNIT/ML 100 UNIT/ML
100 SOLUTION INTRAVENOUS
OUTPATIENT
Start: 2019-04-01

## 2019-04-01 RX ORDER — ONDANSETRON 2 MG/ML
8 INJECTION INTRAMUSCULAR; INTRAVENOUS EVERY 4 HOURS PRN
Start: 2019-04-01

## 2019-04-01 RX ORDER — SODIUM CHLORIDE 0.9 % (FLUSH) 0.9 %
10 SYRINGE (ML) INJECTION
OUTPATIENT
Start: 2019-04-01

## 2019-04-01 RX ADMIN — SODIUM CHLORIDE 1000 ML: 0.9 INJECTION, SOLUTION INTRAVENOUS at 09:04

## 2019-04-01 RX ADMIN — PROMETHAZINE HYDROCHLORIDE 12.5 MG: 25 INJECTION INTRAMUSCULAR; INTRAVENOUS at 09:04

## 2019-04-01 NOTE — PLAN OF CARE
Problem: Adult Inpatient Plan of Care  Goal: Plan of Care Review  Outcome: Outcome(s) achieved Date Met: 04/01/19  IVF and Phenergan infusion to left arm IV complete. Pt tolerated well. VSS. NAD. AVS provided. Pt verbalized understanding of discharge instructions before leaving with wife..

## 2019-04-01 NOTE — PLAN OF CARE
Problem: Adult Inpatient Plan of Care  Goal: Plan of Care Review  Outcome: Ongoing (interventions implemented as appropriate)  Patient tolerated treatment without issue. VSS, AVS provided. No upcoming appointments scheduled at this time.

## 2019-04-02 ENCOUNTER — LAB VISIT (OUTPATIENT)
Dept: LAB | Facility: OTHER | Age: 39
End: 2019-04-02
Attending: INTERNAL MEDICINE
Payer: COMMERCIAL

## 2019-04-02 DIAGNOSIS — R11.0 NAUSEA: ICD-10-CM

## 2019-04-02 DIAGNOSIS — K31.84 GASTROPARESIS: ICD-10-CM

## 2019-04-02 DIAGNOSIS — M79.10 MYALGIA: ICD-10-CM

## 2019-04-02 LAB
25(OH)D3+25(OH)D2 SERPL-MCNC: 35 NG/ML (ref 30–96)
ALBUMIN SERPL BCP-MCNC: 3.8 G/DL (ref 3.5–5.2)
ALP SERPL-CCNC: 71 U/L (ref 55–135)
ALT SERPL W/O P-5'-P-CCNC: 13 U/L (ref 10–44)
ANION GAP SERPL CALC-SCNC: 6 MMOL/L (ref 8–16)
AST SERPL-CCNC: 18 U/L (ref 10–40)
BASOPHILS # BLD AUTO: 0.03 K/UL (ref 0–0.2)
BASOPHILS NFR BLD: 0.6 % (ref 0–1.9)
BILIRUB SERPL-MCNC: 0.4 MG/DL (ref 0.1–1)
BUN SERPL-MCNC: 6 MG/DL (ref 6–20)
CALCIUM SERPL-MCNC: 9.3 MG/DL (ref 8.7–10.5)
CHLORIDE SERPL-SCNC: 104 MMOL/L (ref 95–110)
CO2 SERPL-SCNC: 30 MMOL/L (ref 23–29)
CREAT SERPL-MCNC: 0.7 MG/DL (ref 0.5–1.4)
DIFFERENTIAL METHOD: NORMAL
EOSINOPHIL # BLD AUTO: 0.1 K/UL (ref 0–0.5)
EOSINOPHIL NFR BLD: 1.9 % (ref 0–8)
ERYTHROCYTE [DISTWIDTH] IN BLOOD BY AUTOMATED COUNT: 12.8 % (ref 11.5–14.5)
EST. GFR  (AFRICAN AMERICAN): >60 ML/MIN/1.73 M^2
EST. GFR  (NON AFRICAN AMERICAN): >60 ML/MIN/1.73 M^2
GLUCOSE SERPL-MCNC: 82 MG/DL (ref 70–110)
HCT VFR BLD AUTO: 39.2 % (ref 37–48.5)
HGB BLD-MCNC: 12.8 G/DL (ref 12–16)
LYMPHOCYTES # BLD AUTO: 1.9 K/UL (ref 1–4.8)
LYMPHOCYTES NFR BLD: 40.3 % (ref 18–48)
MAGNESIUM SERPL-MCNC: 2.4 MG/DL (ref 1.6–2.6)
MCH RBC QN AUTO: 28.4 PG (ref 27–31)
MCHC RBC AUTO-ENTMCNC: 32.7 G/DL (ref 32–36)
MCV RBC AUTO: 87 FL (ref 82–98)
MONOCYTES # BLD AUTO: 0.4 K/UL (ref 0.3–1)
MONOCYTES NFR BLD: 8.8 % (ref 4–15)
NEUTROPHILS # BLD AUTO: 2.2 K/UL (ref 1.8–7.7)
NEUTROPHILS NFR BLD: 48.2 % (ref 38–73)
PLATELET # BLD AUTO: 307 K/UL (ref 150–350)
PMV BLD AUTO: 12.1 FL (ref 9.2–12.9)
POTASSIUM SERPL-SCNC: 4.3 MMOL/L (ref 3.5–5.1)
PREALB SERPL-MCNC: 26 MG/DL (ref 20–43)
PROT SERPL-MCNC: 7 G/DL (ref 6–8.4)
RBC # BLD AUTO: 4.5 M/UL (ref 4–5.4)
SODIUM SERPL-SCNC: 140 MMOL/L (ref 136–145)
T4 FREE SERPL-MCNC: 0.78 NG/DL (ref 0.71–1.51)
TSH SERPL DL<=0.005 MIU/L-ACNC: 0.64 UIU/ML (ref 0.4–4)
VIT B12 SERPL-MCNC: 844 PG/ML (ref 210–950)
WBC # BLD AUTO: 4.66 K/UL (ref 3.9–12.7)

## 2019-04-02 PROCEDURE — 82607 VITAMIN B-12: CPT

## 2019-04-02 PROCEDURE — 83735 ASSAY OF MAGNESIUM: CPT

## 2019-04-02 PROCEDURE — 85025 COMPLETE CBC W/AUTO DIFF WBC: CPT

## 2019-04-02 PROCEDURE — 84439 ASSAY OF FREE THYROXINE: CPT

## 2019-04-02 PROCEDURE — 36415 COLL VENOUS BLD VENIPUNCTURE: CPT

## 2019-04-02 PROCEDURE — 84134 ASSAY OF PREALBUMIN: CPT

## 2019-04-02 PROCEDURE — 84443 ASSAY THYROID STIM HORMONE: CPT

## 2019-04-02 PROCEDURE — 82306 VITAMIN D 25 HYDROXY: CPT

## 2019-04-02 PROCEDURE — 80053 COMPREHEN METABOLIC PANEL: CPT

## 2019-05-29 ENCOUNTER — LAB VISIT (OUTPATIENT)
Dept: LAB | Facility: OTHER | Age: 39
End: 2019-05-29
Attending: INTERNAL MEDICINE
Payer: COMMERCIAL

## 2019-05-29 DIAGNOSIS — Z01.84 IMMUNITY TO MEASLES DETERMINED BY SEROLOGIC TEST: ICD-10-CM

## 2019-05-29 PROCEDURE — 36415 COLL VENOUS BLD VENIPUNCTURE: CPT

## 2019-05-29 PROCEDURE — 86765 RUBEOLA ANTIBODY: CPT

## 2019-05-30 LAB
RUBEOLA IGG ANTIBODY: 1.47 ISR (ref 0–0.9)
RUBEOLA INTERPRETATION: POSITIVE

## 2021-01-22 ENCOUNTER — PATIENT MESSAGE (OUTPATIENT)
Dept: ADMINISTRATIVE | Facility: OTHER | Age: 41
End: 2021-01-22

## 2021-02-24 ENCOUNTER — TELEPHONE (OUTPATIENT)
Dept: INTERNAL MEDICINE | Facility: CLINIC | Age: 41
End: 2021-02-24

## 2021-02-24 DIAGNOSIS — Z20.822 ENCOUNTER FOR LABORATORY TESTING FOR COVID-19 VIRUS: ICD-10-CM

## 2021-12-23 NOTE — PLAN OF CARE
Problem: Adult Inpatient Plan of Care  Goal: Plan of Care Review  Outcome: Ongoing (interventions implemented as appropriate)  Pt tolerated IVF and phenergan without issue. VSS. AVS given.        no

## 2022-05-05 ENCOUNTER — HOSPITAL ENCOUNTER (EMERGENCY)
Facility: OTHER | Age: 42
Discharge: HOME | End: 2022-05-05
Attending: EMERGENCY MEDICINE
Payer: COMMERCIAL

## 2022-05-05 VITALS
TEMPERATURE: 99 F | BODY MASS INDEX: 30.44 KG/M2 | SYSTOLIC BLOOD PRESSURE: 121 MMHG | DIASTOLIC BLOOD PRESSURE: 73 MMHG | RESPIRATION RATE: 18 BRPM | HEIGHT: 58 IN | WEIGHT: 145 LBS | HEART RATE: 102 BPM | OXYGEN SATURATION: 99 %

## 2022-05-05 DIAGNOSIS — S93.401A SPRAIN OF RIGHT ANKLE, UNSPECIFIED LIGAMENT, INITIAL ENCOUNTER: Primary | ICD-10-CM

## 2022-05-05 DIAGNOSIS — M25.579 ANKLE PAIN: ICD-10-CM

## 2022-05-05 PROCEDURE — 99284 EMERGENCY DEPT VISIT MOD MDM: CPT | Mod: 25

## 2022-05-05 RX ORDER — METHOCARBAMOL 500 MG/1
1000 TABLET, FILM COATED ORAL 3 TIMES DAILY
Qty: 30 TABLET | Refills: 0 | Status: SHIPPED | OUTPATIENT
Start: 2022-05-05 | End: 2022-05-10

## 2022-05-05 RX ORDER — ACETAMINOPHEN 500 MG
1000 TABLET ORAL
Status: DISCONTINUED | OUTPATIENT
Start: 2022-05-05 | End: 2022-05-05 | Stop reason: HOSPADM

## 2022-05-05 RX ORDER — HYDROCODONE BITARTRATE AND ACETAMINOPHEN 5; 325 MG/1; MG/1
1 TABLET ORAL 4 TIMES DAILY
Qty: 8 TABLET | Refills: 0 | Status: SHIPPED | OUTPATIENT
Start: 2022-05-05 | End: 2022-05-07

## 2022-05-05 NOTE — ED TRIAGE NOTES
"Pt arrived with c/o R foot and ankle pain s/p falling down 3 stairs after missing a step.  Pt reports she rolled her R ankle.  Pt states she heard a "snap," landed on her buttocks, denies any head injury.  Swelling and bruising noted to dorsal and lateral aspect of R foot.  Pt states she has been unable to bear weight on R foot.  Pedal pulses present bilaterally, sensation intact.  "

## 2022-05-05 NOTE — ED PROVIDER NOTES
"Source of History:  Patient     Chief complaint:  Ankle Pain (Missed step on lower stairs, rolled ankle , reports heard a snap, +pain, +swelling to right ankle. )      HPI:  Tiffanie Wharton is a 41 y.o. female presenting to the emergency department with complaint of right lateral ankle pain that began prior to arrival.  Patient reports she thought she was at the bottom of the stairs but missed the last step states that her right ankle rolled and she has felt a snap in her to popping sensation.  She has been unable to ambulate since the fall.      This is the extent to the patients complaints today here in the emergency department.    PMH:  As per HPI and below:  Past Medical History:   Diagnosis Date    ADHD (attention deficit hyperactivity disorder)     Crohn's disease     Gastroparesis      Past Surgical History:   Procedure Laterality Date    ABDOMINAL SURGERY      ESOPHAGOGASTRODUODENOSCOPY  2019    GASTRIC STIMULATOR [facility]PLANT SURGERY      [facility]       Social History     Tobacco Use    Smoking status: Former Smoker     Quit date: 2009     Years since quittin.3    Smokeless tobacco: Never Used   Substance Use Topics    Alcohol use: No     Alcohol/week: 0.0 standard drinks    Drug use: Yes     Types: Marijuana     Review of patient's allergies indicates:   Allergen Reactions    Phytonadione (vit k1) (bulk) Anaphylaxis    Penicillins Rash       ROS: As per HPI and below:  General: No fever.  No chills.  Eyes: No visual changes.   ENT: No sore throat. No ear pain.  Urinary: No abnormal urination.  MSK:  Ankle pain  Integument: No rashes or lesions.      Physical Exam:    /73   Pulse 102   Temp 98.8 °F (37.1 °C)   Resp 18   Ht 4' 10" (1.473 m)   Wt 65.8 kg (145 lb)   SpO2 99%   BMI 30.31 kg/m²   Vitals:    22 1337 22 1536 22 1542   BP: 110/73 121/73 121/73   Pulse: (!) 128  102   Resp: (!) 22  18   Temp: 98.5 °F (36.9 °C) 98.8 °F (37.1 °C)  " "  TempSrc: Oral     SpO2: 98% 99%    Weight: 65.8 kg (145 lb)     Height: 4' 10" (1.473 m)         Nursing note and vital signs reviewed.  Appearance: No acute distress.  Eyes: No conjunctival injection.  Extraocular muscles are intact.  ENT: Normal phonation.  Cardio:  DP +2 bilaterally  Musculoskeletal:  Right lateral ankle tender to palpation, there is no significant bruising, swelling, no deformity.  Negative Cotton sign.  Decreased range of motion is noted due to pain.  Skin: No rashes seen.  Good turgor.  No abrasions.  No ecchymoses.  Mental Status:  Alert and oriented x 3.  Appropriate, conversant.    Labs Reviewed - No data to display    X-Ray Ankle Complete Right   Final Result   Addendum 1 of 1      After discussion with the ordering provider, there is subtle irregular    contour of the inferior lateral malleolus versus questionable avulsion    injury at this location.  Correlation with mechanism of injury and point    tenderness is needed.         Electronically signed by: Albino Angel MD   Date:    05/05/2022   Time:    15:04      Final      Soft tissue edema.  No displaced fracture.         Electronically signed by: Albino Angel MD   Date:    05/05/2022   Time:    14:10            Initial Impression/ Differential Dx:  Differential Diagnosis includes, but is not limited to:  Ankle sprain/strain, tib/fib fracture, ankle disclocation, metatarsal fracture, gout    MDM:    41 y.o. female with right lateral ankle pain after twisting her ankle walking down some steps prior to arrival.  On exam there was tenderness to the lateral malleolus without deformity or swelling or ecchymosis.  Initial x-ray was read as negative however when discussing with the radiologist there is a possible small avulsion fracture to the lateral malleolus verses irregular contour.  This is where the location of the patient's pain is due to this she will be placed in a walking boot.  The patient is flying home to [place] " tomorrow and will be on planes for 16 hours so will prescribe a short course of opioids for the patient as shoe been walking a lot in the airports.  I discussed if her symptoms have not improved in the next week she has follow-up Orthopedics for continued evaluation of possible avulsion fracture.         Diagnostic Impression:    1. Sprain of right ankle, unspecified ligament, initial encounter    2. Ankle pain         ED Disposition Condition    Discharge Stable          ED Prescriptions     Medication Sig Dispense Start Date End Date Auth. Provider    methocarbamoL (ROBAXIN) 500 MG Tab Take 2 tablets (1,000 mg total) by mouth 3 (three) times daily. for 5 days 30 tablet 5/5/2022 5/10/2022 TRUE Dale    HYDROcodone-acetaminophen (NORCO) 5-325 mg per tablet Take 1 tablet by mouth 4 (four) times daily. for 2 days 8 tablet 5/5/2022 5/7/2022 TRUE Dale        Follow-up Information     Follow up With Specialties Details Why Contact Info    [facility] - Emergency Dept Emergency Medicine Go to  If symptoms worsen [address] [facility] [id number] [phone]          ED Prescriptions     Medication Sig Dispense Start Date End Date Auth. Provider    methocarbamoL (ROBAXIN) 500 MG Tab Take 2 tablets (1,000 mg total) by mouth 3 (three) times daily. for 5 days 30 tablet 5/5/2022 5/10/2022 TRUE Dale    HYDROcodone-acetaminophen (NORCO) 5-325 mg per tablet Take 1 tablet by mouth 4 (four) times daily. for 2 days 8 tablet 5/5/2022 5/7/2022 TRUE Dale FNP  05/05/22 2628     Excision Method: Elliptical